# Patient Record
Sex: FEMALE | Race: OTHER | NOT HISPANIC OR LATINO | URBAN - METROPOLITAN AREA
[De-identification: names, ages, dates, MRNs, and addresses within clinical notes are randomized per-mention and may not be internally consistent; named-entity substitution may affect disease eponyms.]

---

## 2017-02-04 ENCOUNTER — EMERGENCY (EMERGENCY)
Facility: HOSPITAL | Age: 62
LOS: 1 days | Discharge: HOME | End: 2017-02-04
Attending: EMERGENCY MEDICINE | Admitting: EMERGENCY MEDICINE
Payer: COMMERCIAL

## 2017-02-04 VITALS
SYSTOLIC BLOOD PRESSURE: 136 MMHG | HEART RATE: 66 BPM | DIASTOLIC BLOOD PRESSURE: 84 MMHG | TEMPERATURE: 98 F | RESPIRATION RATE: 18 BRPM | OXYGEN SATURATION: 100 %

## 2017-02-04 PROCEDURE — 99284 EMERGENCY DEPT VISIT MOD MDM: CPT

## 2017-02-04 NOTE — ED ADULT NURSE NOTE - OBJECTIVE STATEMENT
Float Nurse Note- pt suddenly developed atraumatic R knee pain earlier today. States she cannot lift leg up to reciprocate stairs. was driving here and developed abd pain and vag bleeding. went through two pads. Is postmenopausal, but had a cystoscopy about two weeks ago for unk gyn issues. Was told it was not cancer.  respirations even and unlabored. awaiting urine for UA. iv placed, labs drawn and sent. vitals as noted. pt endorsed to Primary RN Kun Tristan

## 2017-02-04 NOTE — ED ADULT NURSE NOTE - CHIEF COMPLAINT QUOTE
right knee pain/vag bleed    pt states while walking up stairs, hurt right knee and then started to have a vag bleed. used 2 pads. had cystoscopy a couple weeks ago

## 2017-02-04 NOTE — ED ADULT TRIAGE NOTE - CHIEF COMPLAINT QUOTE
right knee pain/vag bleed    pt states while walking up stairs, hurt right knee and then started to have a vag bleed. used 2 pads. right knee pain/vag bleed    pt states while walking up stairs, hurt right knee and then started to have a vag bleed. used 2 pads. had cystoscopy a couple weeks ago

## 2017-02-05 VITALS
HEART RATE: 67 BPM | DIASTOLIC BLOOD PRESSURE: 67 MMHG | RESPIRATION RATE: 14 BRPM | OXYGEN SATURATION: 99 % | SYSTOLIC BLOOD PRESSURE: 116 MMHG

## 2017-02-05 LAB
ALBUMIN SERPL ELPH-MCNC: 3.9 G/DL — SIGNIFICANT CHANGE UP (ref 3.3–5)
ALP SERPL-CCNC: 57 U/L — SIGNIFICANT CHANGE UP (ref 40–120)
ALT FLD-CCNC: 24 U/L — SIGNIFICANT CHANGE UP (ref 4–33)
APPEARANCE UR: CLEAR — SIGNIFICANT CHANGE UP
AST SERPL-CCNC: 11 U/L — SIGNIFICANT CHANGE UP (ref 4–32)
BASE EXCESS BLDV CALC-SCNC: 4.7 MMOL/L — SIGNIFICANT CHANGE UP
BASOPHILS # BLD AUTO: 0.05 K/UL — SIGNIFICANT CHANGE UP (ref 0–0.2)
BASOPHILS NFR BLD AUTO: 0.4 % — SIGNIFICANT CHANGE UP (ref 0–2)
BILIRUB SERPL-MCNC: 0.2 MG/DL — SIGNIFICANT CHANGE UP (ref 0.2–1.2)
BILIRUB UR-MCNC: NEGATIVE — SIGNIFICANT CHANGE UP
BLOOD GAS VENOUS - CREATININE: 0.8 MG/DL — SIGNIFICANT CHANGE UP (ref 0.5–1.3)
BLOOD UR QL VISUAL: HIGH
BUN SERPL-MCNC: 15 MG/DL — SIGNIFICANT CHANGE UP (ref 7–23)
CALCIUM SERPL-MCNC: 9.2 MG/DL — SIGNIFICANT CHANGE UP (ref 8.4–10.5)
CHLORIDE BLDV-SCNC: 102 MMOL/L — SIGNIFICANT CHANGE UP (ref 96–108)
CHLORIDE SERPL-SCNC: 99 MMOL/L — SIGNIFICANT CHANGE UP (ref 98–107)
CO2 SERPL-SCNC: 23 MMOL/L — SIGNIFICANT CHANGE UP (ref 22–31)
COLOR SPEC: SIGNIFICANT CHANGE UP
CREAT SERPL-MCNC: 0.95 MG/DL — SIGNIFICANT CHANGE UP (ref 0.5–1.3)
EOSINOPHIL # BLD AUTO: 0.25 K/UL — SIGNIFICANT CHANGE UP (ref 0–0.5)
EOSINOPHIL NFR BLD AUTO: 2.2 % — SIGNIFICANT CHANGE UP (ref 0–6)
GAS PNL BLDV: 137 MMOL/L — SIGNIFICANT CHANGE UP (ref 136–146)
GLUCOSE BLDV-MCNC: 285 — HIGH (ref 70–99)
GLUCOSE SERPL-MCNC: 283 MG/DL — HIGH (ref 70–99)
GLUCOSE UR-MCNC: >1000 — SIGNIFICANT CHANGE UP
HCO3 BLDV-SCNC: 27 MMOL/L — SIGNIFICANT CHANGE UP (ref 20–27)
HCT VFR BLD CALC: 41.7 % — SIGNIFICANT CHANGE UP (ref 34.5–45)
HCT VFR BLDV CALC: 44.2 % — SIGNIFICANT CHANGE UP (ref 34.5–45)
HGB BLD-MCNC: 14.4 G/DL — SIGNIFICANT CHANGE UP (ref 11.5–15.5)
HGB BLDV-MCNC: 14.4 G/DL — SIGNIFICANT CHANGE UP (ref 11.5–15.5)
IMM GRANULOCYTES NFR BLD AUTO: 0.4 % — SIGNIFICANT CHANGE UP (ref 0–1.5)
KETONES UR-MCNC: NEGATIVE — SIGNIFICANT CHANGE UP
LACTATE BLDV-MCNC: 2.2 MMOL/L — HIGH (ref 0.5–2)
LEUKOCYTE ESTERASE UR-ACNC: NEGATIVE — SIGNIFICANT CHANGE UP
LYMPHOCYTES # BLD AUTO: 44.6 % — HIGH (ref 13–44)
LYMPHOCYTES # BLD AUTO: 5.18 K/UL — HIGH (ref 1–3.3)
MCHC RBC-ENTMCNC: 31 PG — SIGNIFICANT CHANGE UP (ref 27–34)
MCHC RBC-ENTMCNC: 34.5 % — SIGNIFICANT CHANGE UP (ref 32–36)
MCV RBC AUTO: 89.7 FL — SIGNIFICANT CHANGE UP (ref 80–100)
MONOCYTES # BLD AUTO: 0.62 K/UL — SIGNIFICANT CHANGE UP (ref 0–0.9)
MONOCYTES NFR BLD AUTO: 5.3 % — SIGNIFICANT CHANGE UP (ref 2–14)
NEUTROPHILS # BLD AUTO: 5.46 K/UL — SIGNIFICANT CHANGE UP (ref 1.8–7.4)
NEUTROPHILS NFR BLD AUTO: 47.1 % — SIGNIFICANT CHANGE UP (ref 43–77)
NITRITE UR-MCNC: NEGATIVE — SIGNIFICANT CHANGE UP
PCO2 BLDV: 49 MMHG — SIGNIFICANT CHANGE UP (ref 41–51)
PH BLDV: 7.39 PH — SIGNIFICANT CHANGE UP (ref 7.32–7.43)
PH UR: 5.5 — SIGNIFICANT CHANGE UP (ref 4.6–8)
PLATELET # BLD AUTO: 276 K/UL — SIGNIFICANT CHANGE UP (ref 150–400)
PMV BLD: 11.4 FL — SIGNIFICANT CHANGE UP (ref 7–13)
PO2 BLDV: 29 MMHG — LOW (ref 35–40)
POTASSIUM BLDV-SCNC: 3.5 MMOL/L — SIGNIFICANT CHANGE UP (ref 3.4–4.5)
POTASSIUM SERPL-MCNC: 3.7 MMOL/L — SIGNIFICANT CHANGE UP (ref 3.5–5.3)
POTASSIUM SERPL-SCNC: 3.7 MMOL/L — SIGNIFICANT CHANGE UP (ref 3.5–5.3)
PROT SERPL-MCNC: 7.4 G/DL — SIGNIFICANT CHANGE UP (ref 6–8.3)
PROT UR-MCNC: NEGATIVE — SIGNIFICANT CHANGE UP
RBC # BLD: 4.65 M/UL — SIGNIFICANT CHANGE UP (ref 3.8–5.2)
RBC # FLD: 13.2 % — SIGNIFICANT CHANGE UP (ref 10.3–14.5)
RBC CASTS # UR COMP ASSIST: SIGNIFICANT CHANGE UP (ref 0–?)
SAO2 % BLDV: 48.2 % — LOW (ref 60–85)
SODIUM SERPL-SCNC: 142 MMOL/L — SIGNIFICANT CHANGE UP (ref 135–145)
SP GR SPEC: 1.04 — HIGH (ref 1–1.03)
SQUAMOUS # UR AUTO: SIGNIFICANT CHANGE UP
UROBILINOGEN FLD QL: NORMAL E.U. — SIGNIFICANT CHANGE UP (ref 0.1–0.2)
WBC # BLD: 11.61 K/UL — HIGH (ref 3.8–10.5)
WBC # FLD AUTO: 11.61 K/UL — HIGH (ref 3.8–10.5)
WBC UR QL: SIGNIFICANT CHANGE UP (ref 0–?)

## 2017-02-05 PROCEDURE — 76830 TRANSVAGINAL US NON-OB: CPT | Mod: 26

## 2017-02-05 PROCEDURE — 73562 X-RAY EXAM OF KNEE 3: CPT | Mod: 26,RT

## 2017-02-05 RX ORDER — SODIUM CHLORIDE 9 MG/ML
1000 INJECTION INTRAMUSCULAR; INTRAVENOUS; SUBCUTANEOUS ONCE
Qty: 0 | Refills: 0 | Status: COMPLETED | OUTPATIENT
Start: 2017-02-05 | End: 2017-02-05

## 2017-02-05 RX ORDER — IBUPROFEN 200 MG
800 TABLET ORAL ONCE
Qty: 0 | Refills: 0 | Status: COMPLETED | OUTPATIENT
Start: 2017-02-05 | End: 2017-02-05

## 2017-02-05 RX ADMIN — Medication 800 MILLIGRAM(S): at 01:18

## 2017-02-05 RX ADMIN — Medication 800 MILLIGRAM(S): at 05:03

## 2017-02-05 RX ADMIN — SODIUM CHLORIDE 1000 MILLILITER(S): 9 INJECTION INTRAMUSCULAR; INTRAVENOUS; SUBCUTANEOUS at 01:18

## 2017-02-05 RX ADMIN — Medication 800 MILLIGRAM(S): at 02:18

## 2017-02-05 NOTE — ED PROVIDER NOTE - PHYSICAL EXAMINATION
Cole att: Cole att: older female appears stated age, aaox3, ncat, perrl, full eomi without limitation, ctabl, rrr, s1s2, abd soft ntnd, neg chest wall tenderness, pelvis stable. 4 extremities neg ttp pulses and sensation intact. Pos pain with rt knee flexion, neg limiitation rt knee extension.

## 2017-02-05 NOTE — ED PROVIDER NOTE - ATTENDING CONTRIBUTION TO CARE
Dr. Galvan: I have personally seen and examined this patient at the bedside. I have fully participated in the care of this patient. I have reviewed all pertinent clinical information, including history, physical exam, plan and the Resident's note and agree except as noted. HPI above as by me. PE above as by me. RT KNEE DDX likley partial quad tendon rupture, r/o patellar or long bone fx VAG BLEED DDX postmenopausal bleed likely cancerous, unclear if recent biopsy uterine or cervical PLAN cbc cmp xr knee and tib fib, ace wrap, cane, 10 min d/w patient regarding importance of following with her OBGYN regarding biopsy result Dr. Galvan: I have personally seen and examined this patient at the bedside. I have fully participated in the care of this patient. I have reviewed all pertinent clinical information, including history, physical exam, plan and the Resident's note and agree except as noted. HPI above as by me. PE above as by me. RT KNEE DDX likley partial quad tendon rupture, r/o patellar or long bone fx VAG BLEED DDX postmenopausal bleed likely cancerous, unclear if recent biopsy uterine or cervical PLAN cbc cmp xr knee and tib fib, ace wrap, cane, 10 min d/w patient regarding importance of following with her OBGYN regarding biopsy result as post-menopausal bleeding may be cancerous.

## 2017-02-05 NOTE — ED PROVIDER NOTE - EXTREMITY EXAM
RLE: Rknee with decreased ROM 2/2 pain, tender over patella. No laxity. No lateral or medial tenderness.

## 2017-02-05 NOTE — ED PROVIDER NOTE - OBJECTIVE STATEMENT
61F h/o HTN, HLD, DM p/w R knee pain since this afternoon. First noticed it when stood up from chair, and then had difficulty walking up the stairs. Also had some abdominal cramping this evening and began having vaginal bleeding, has gone through 2 pads. Had a uterine biopsy 1/13/2017 that was negative. Denies falls, trauma, other joint pains, fever, n/v/d. Pt is able to ambulate. 61F h/o HTN, HLD, DM p/w R knee pain since this afternoon. First noticed it when stood up from chair, and then had difficulty walking up the stairs. Also had some abdominal cramping this evening and began having vaginal bleeding, has gone through 2 pads. Had a uterine biopsy 1/13/2017 that was negative. Denies falls, trauma, other joint pains, fever, n/v/d. Pt is able to ambulate.  Cole att: 61F hx, htn, dm c/o atraumatic rt knee pain x 1 day. Today patient citlaly from seated position, unable to push off of rt foot, rt knee no force. While climbing stairs "knee not giving me pressure to raise leg up. Feels like something is biting me in knee." Denies ha, denies distal motor or sensory deficit of affected limb. Knee pain abated with motrin. Incidental vaginal bleeding this evening, 2 pads this evening,  2015 uterine polyps, biopsy 1/13 with Dr. Hernandez negative.

## 2017-02-05 NOTE — ED PROVIDER NOTE - PLAN OF CARE
- Follow up with orthopedics within one week, see attached list or Call [phone] to find a doctor affiliated with [facility] in your neighborhood.   - Wear knee immobilizer, advance ambulating as tolerated. Take motrin for pain.   - Follow up with your OBGYN, bring results with you to the appointment.   - Return to the ED for new or worsening symptoms.

## 2017-02-05 NOTE — ED ADULT NURSE REASSESSMENT NOTE - NS ED NURSE REASSESS COMMENT FT1
Informed the resident about lactate 2.2, he said no need to repeat after bolus because it's not sepsis.

## 2017-02-05 NOTE — ED PROVIDER NOTE - CARE PLAN
Principal Discharge DX:	Knee pain  Instructions for follow-up, activity and diet:	- Follow up with orthopedics within one week, see attached list or Call [phone] to find a doctor affiliated with [facility] in your neighborhood.   - Wear knee immobilizer, advance ambulating as tolerated. Take motrin for pain.   - Follow up with your OBGYN, bring results with you to the appointment.   - Return to the ED for new or worsening symptoms.  Secondary Diagnosis:	Vaginal bleeding Principal Discharge DX:	Knee pain  Instructions for follow-up, activity and diet:	- Follow up with orthopedics within one week, see attached list or Call [phone] to find a doctor affiliated with [facility] in your neighborhood.   - Wear knee immobilizer, advance ambulating as tolerated. Take motrin for pain.   - Follow up with your OBGYN, bring results with you to the appointment.   - Return to the ED for new or worsening symptoms.  Secondary Diagnosis:	Vaginal bleeding Principal Discharge DX:	Knee pain  Instructions for follow-up, activity and diet:	- Follow up with orthopedics within one week, see attached list or Call [phone] to find a doctor affiliated with [facility] in your neighborhood.   - Wear knee immobilizer, advance ambulating as tolerated. Take motrin for pain.   - Follow up with your OBGYN, bring results with you to the appointment.   - Return to the ED for new or worsening symptoms.  Secondary Diagnosis:	Vaginal bleeding

## 2017-12-12 ENCOUNTER — EMERGENCY (EMERGENCY)
Facility: HOSPITAL | Age: 62
LOS: 1 days | Discharge: HOME | End: 2017-12-12
Attending: EMERGENCY MEDICINE | Admitting: EMERGENCY MEDICINE
Payer: COMMERCIAL

## 2017-12-12 VITALS
RESPIRATION RATE: 16 BRPM | OXYGEN SATURATION: 100 % | TEMPERATURE: 98 F | HEART RATE: 71 BPM | DIASTOLIC BLOOD PRESSURE: 71 MMHG | SYSTOLIC BLOOD PRESSURE: 132 MMHG

## 2017-12-12 LAB
BASE EXCESS BLDV CALC-SCNC: 6 MMOL/L — SIGNIFICANT CHANGE UP
BASOPHILS # BLD AUTO: 0.07 K/UL — SIGNIFICANT CHANGE UP (ref 0–0.2)
BASOPHILS NFR BLD AUTO: 0.7 % — SIGNIFICANT CHANGE UP (ref 0–2)
BLOOD GAS VENOUS - CREATININE: 0.75 MG/DL — SIGNIFICANT CHANGE UP (ref 0.5–1.3)
CHLORIDE BLDV-SCNC: 100 MMOL/L — SIGNIFICANT CHANGE UP (ref 96–108)
EOSINOPHIL # BLD AUTO: 0.24 K/UL — SIGNIFICANT CHANGE UP (ref 0–0.5)
EOSINOPHIL NFR BLD AUTO: 2.5 % — SIGNIFICANT CHANGE UP (ref 0–6)
GAS PNL BLDV: 138 MMOL/L — SIGNIFICANT CHANGE UP (ref 136–146)
GLUCOSE BLDV-MCNC: 279 — HIGH (ref 70–99)
HCO3 BLDV-SCNC: 27 MMOL/L — SIGNIFICANT CHANGE UP (ref 20–27)
HCT VFR BLD CALC: 38.9 % — SIGNIFICANT CHANGE UP (ref 34.5–45)
HCT VFR BLDV CALC: 42.5 % — SIGNIFICANT CHANGE UP (ref 34.5–45)
HGB BLD-MCNC: 13.7 G/DL — SIGNIFICANT CHANGE UP (ref 11.5–15.5)
HGB BLDV-MCNC: 13.8 G/DL — SIGNIFICANT CHANGE UP (ref 11.5–15.5)
IMM GRANULOCYTES # BLD AUTO: 0.03 # — SIGNIFICANT CHANGE UP
IMM GRANULOCYTES NFR BLD AUTO: 0.3 % — SIGNIFICANT CHANGE UP (ref 0–1.5)
LACTATE BLDV-MCNC: 2 MMOL/L — SIGNIFICANT CHANGE UP (ref 0.5–2)
LYMPHOCYTES # BLD AUTO: 4.65 K/UL — HIGH (ref 1–3.3)
LYMPHOCYTES # BLD AUTO: 48.7 % — HIGH (ref 13–44)
MCHC RBC-ENTMCNC: 32.2 PG — SIGNIFICANT CHANGE UP (ref 27–34)
MCHC RBC-ENTMCNC: 35.2 % — SIGNIFICANT CHANGE UP (ref 32–36)
MCV RBC AUTO: 91.3 FL — SIGNIFICANT CHANGE UP (ref 80–100)
MONOCYTES # BLD AUTO: 0.58 K/UL — SIGNIFICANT CHANGE UP (ref 0–0.9)
MONOCYTES NFR BLD AUTO: 6.1 % — SIGNIFICANT CHANGE UP (ref 2–14)
NEUTROPHILS # BLD AUTO: 3.98 K/UL — SIGNIFICANT CHANGE UP (ref 1.8–7.4)
NEUTROPHILS NFR BLD AUTO: 41.7 % — LOW (ref 43–77)
NRBC # FLD: 0 — SIGNIFICANT CHANGE UP
PCO2 BLDV: 59 MMHG — HIGH (ref 41–51)
PH BLDV: 7.35 PH — SIGNIFICANT CHANGE UP (ref 7.32–7.43)
PLATELET # BLD AUTO: 248 K/UL — SIGNIFICANT CHANGE UP (ref 150–400)
PMV BLD: 10.7 FL — SIGNIFICANT CHANGE UP (ref 7–13)
PO2 BLDV: < 24 MMHG — LOW (ref 35–40)
POTASSIUM BLDV-SCNC: 3.8 MMOL/L — SIGNIFICANT CHANGE UP (ref 3.4–4.5)
RBC # BLD: 4.26 M/UL — SIGNIFICANT CHANGE UP (ref 3.8–5.2)
RBC # FLD: 12.3 % — SIGNIFICANT CHANGE UP (ref 10.3–14.5)
SAO2 % BLDV: 36.1 % — LOW (ref 60–85)
WBC # BLD: 9.55 K/UL — SIGNIFICANT CHANGE UP (ref 3.8–10.5)
WBC # FLD AUTO: 9.55 K/UL — SIGNIFICANT CHANGE UP (ref 3.8–10.5)

## 2017-12-12 PROCEDURE — 99285 EMERGENCY DEPT VISIT HI MDM: CPT

## 2017-12-12 RX ORDER — SODIUM CHLORIDE 9 MG/ML
1000 INJECTION INTRAMUSCULAR; INTRAVENOUS; SUBCUTANEOUS ONCE
Qty: 0 | Refills: 0 | Status: COMPLETED | OUTPATIENT
Start: 2017-12-12 | End: 2017-12-12

## 2017-12-12 RX ORDER — MORPHINE SULFATE 50 MG/1
4 CAPSULE, EXTENDED RELEASE ORAL ONCE
Qty: 0 | Refills: 0 | Status: DISCONTINUED | OUTPATIENT
Start: 2017-12-12 | End: 2017-12-12

## 2017-12-12 RX ADMIN — MORPHINE SULFATE 4 MILLIGRAM(S): 50 CAPSULE, EXTENDED RELEASE ORAL at 23:32

## 2017-12-12 RX ADMIN — SODIUM CHLORIDE 1000 MILLILITER(S): 9 INJECTION INTRAMUSCULAR; INTRAVENOUS; SUBCUTANEOUS at 23:33

## 2017-12-12 NOTE — ED PROVIDER NOTE - OBJECTIVE STATEMENT
62F Diabetes presents with LLQ pain. Patient seen GYN in February was found to have polyps and was started on hormonal therapy. Patient then intermittently in the past two weeks have been having intermittent LLQ/pelvic pain 62F Diabetes presents with LLQ pain. Patient seen GYN in February was found to have polyps and was started on hormonal therapy. Patient then intermittently in the past two weeks have been having intermittent LLQ/pelvic pain. Went to the PMD was found to have a white count of ~11 and GYN started on abx and diflucan for possible vaginal yeast infection and UTI. Patient comes to ED today for acute severe left pelvic abdominal pain.

## 2017-12-12 NOTE — ED ADULT NURSE NOTE - OBJECTIVE STATEMENT
Pt received A&Ox3 to ED Rm 19 with c/o LLQ pain x 1 wk worsened today. States she initially had abd cramping but subsided sp doxy prescribed by her OBGYN (unknown dx). Also c/o vag spotting x 1d - currently none. Also c/o intermit nausea, chills. States she had US done which showed polyps & scheduled for d&c on Fri. States menopause x 5yrs. RR equal & unlabored. Abd S/T/ND. MD person in prog.

## 2017-12-12 NOTE — ED ADULT NURSE NOTE - CHIEF COMPLAINT QUOTE
c.o LLQ pain x3 days. states she is having trouble breathing s/t pain. denies nausea/vomiting or urinary symptoms. recently was on progesterone for pelvic cramping and antibiotics for elevated WBC. pmh htn hld

## 2017-12-12 NOTE — ED PROVIDER NOTE - CHIEF COMPLAINT
The patient is a 62y Female complaining of The patient is a 62y Female complaining of left pelvic pain

## 2017-12-12 NOTE — ED PROVIDER NOTE - ATTENDING CONTRIBUTION TO CARE
nickolas: approx 2 week hx of intermittent low abd cramping. saw pcp/gyn (DR Corbin) one week prior who prescribed doxy and hormonal therapy. had ultrasound which by pts. hx showed uterine polyps and d and c scheduled. sx decreased. today sudden onset severe pain left lower abdomen. no other sx. no hx similar pain in past.  exam: point tender LLQ.   labs sent. US pelvis scheduled; if negative and pain persists, to obtain CT. nickolas: approx 2 week hx of intermittent low abd cramping. saw pcp/gyn (DR Corbin) one week prior who prescribed doxy and hormonal therapy. had ultrasound which by pts. hx showed uterine polyps and d and c scheduled. sx decreased. today sudden onset severe pain left lower abdomen. no other sx. no hx similar pain in past..  exam: point tender LLQ.   labs sent. US pelvis scheduled; if negative and pain persists, to obtain CT.

## 2017-12-12 NOTE — ED PROVIDER NOTE - PROGRESS NOTE DETAILS
US vaginal if negative will need CT scan Sign out follow-up: CT a/p and TV US negative for acute pathology. Pt to f/u with Gyn and GI outpt. SHY.

## 2017-12-13 VITALS
OXYGEN SATURATION: 100 % | RESPIRATION RATE: 15 BRPM | SYSTOLIC BLOOD PRESSURE: 115 MMHG | HEART RATE: 69 BPM | DIASTOLIC BLOOD PRESSURE: 64 MMHG

## 2017-12-13 LAB
ALBUMIN SERPL ELPH-MCNC: 4 G/DL — SIGNIFICANT CHANGE UP (ref 3.3–5)
ALP SERPL-CCNC: 61 U/L — SIGNIFICANT CHANGE UP (ref 40–120)
ALT FLD-CCNC: 41 U/L — HIGH (ref 4–33)
APPEARANCE UR: CLEAR — SIGNIFICANT CHANGE UP
AST SERPL-CCNC: 27 U/L — SIGNIFICANT CHANGE UP (ref 4–32)
BILIRUB SERPL-MCNC: 0.2 MG/DL — SIGNIFICANT CHANGE UP (ref 0.2–1.2)
BILIRUB UR-MCNC: NEGATIVE — SIGNIFICANT CHANGE UP
BLOOD UR QL VISUAL: NEGATIVE — SIGNIFICANT CHANGE UP
BUN SERPL-MCNC: 14 MG/DL — SIGNIFICANT CHANGE UP (ref 7–23)
CALCIUM SERPL-MCNC: 8.3 MG/DL — LOW (ref 8.4–10.5)
CHLORIDE SERPL-SCNC: 101 MMOL/L — SIGNIFICANT CHANGE UP (ref 98–107)
CO2 SERPL-SCNC: 29 MMOL/L — SIGNIFICANT CHANGE UP (ref 22–31)
COLOR SPEC: SIGNIFICANT CHANGE UP
CREAT SERPL-MCNC: 0.82 MG/DL — SIGNIFICANT CHANGE UP (ref 0.5–1.3)
GLUCOSE SERPL-MCNC: 275 MG/DL — HIGH (ref 70–99)
GLUCOSE UR-MCNC: 50 — HIGH
KETONES UR-MCNC: NEGATIVE — SIGNIFICANT CHANGE UP
LEUKOCYTE ESTERASE UR-ACNC: NEGATIVE — SIGNIFICANT CHANGE UP
LIDOCAIN IGE QN: 71.5 U/L — HIGH (ref 7–60)
MUCOUS THREADS # UR AUTO: SIGNIFICANT CHANGE UP
NITRITE UR-MCNC: NEGATIVE — SIGNIFICANT CHANGE UP
PH UR: 6 — SIGNIFICANT CHANGE UP (ref 4.6–8)
POTASSIUM SERPL-MCNC: 4.3 MMOL/L — SIGNIFICANT CHANGE UP (ref 3.5–5.3)
POTASSIUM SERPL-SCNC: 4.3 MMOL/L — SIGNIFICANT CHANGE UP (ref 3.5–5.3)
PROT SERPL-MCNC: 6.8 G/DL — SIGNIFICANT CHANGE UP (ref 6–8.3)
PROT UR-MCNC: NEGATIVE MG/DL — SIGNIFICANT CHANGE UP
RBC CASTS # UR COMP ASSIST: SIGNIFICANT CHANGE UP (ref 0–?)
SODIUM SERPL-SCNC: 142 MMOL/L — SIGNIFICANT CHANGE UP (ref 135–145)
SP GR SPEC: 1.02 — SIGNIFICANT CHANGE UP (ref 1–1.04)
SQUAMOUS # UR AUTO: SIGNIFICANT CHANGE UP
UROBILINOGEN FLD QL: NORMAL MG/DL — SIGNIFICANT CHANGE UP
WBC UR QL: SIGNIFICANT CHANGE UP (ref 0–?)

## 2017-12-13 PROCEDURE — 76830 TRANSVAGINAL US NON-OB: CPT | Mod: 26

## 2017-12-13 PROCEDURE — 74177 CT ABD & PELVIS W/CONTRAST: CPT | Mod: 26

## 2017-12-13 RX ORDER — ACETAMINOPHEN 500 MG
1000 TABLET ORAL ONCE
Qty: 0 | Refills: 0 | Status: COMPLETED | OUTPATIENT
Start: 2017-12-13 | End: 2017-12-13

## 2017-12-13 RX ORDER — MORPHINE SULFATE 50 MG/1
4 CAPSULE, EXTENDED RELEASE ORAL ONCE
Qty: 0 | Refills: 0 | Status: DISCONTINUED | OUTPATIENT
Start: 2017-12-13 | End: 2017-12-13

## 2017-12-13 RX ADMIN — Medication 400 MILLIGRAM(S): at 05:01

## 2017-12-13 RX ADMIN — MORPHINE SULFATE 4 MILLIGRAM(S): 50 CAPSULE, EXTENDED RELEASE ORAL at 01:46

## 2017-12-14 LAB
BACTERIA UR CULT: SIGNIFICANT CHANGE UP
SPECIMEN SOURCE: SIGNIFICANT CHANGE UP

## 2018-01-29 PROBLEM — Z00.00 ENCOUNTER FOR PREVENTIVE HEALTH EXAMINATION: Status: ACTIVE | Noted: 2018-01-29

## 2018-01-30 ENCOUNTER — APPOINTMENT (OUTPATIENT)
Dept: MRI IMAGING | Facility: IMAGING CENTER | Age: 63
End: 2018-01-30

## 2018-01-30 ENCOUNTER — OUTPATIENT (OUTPATIENT)
Dept: OUTPATIENT SERVICES | Facility: HOSPITAL | Age: 63
LOS: 1 days | End: 2018-01-30
Payer: COMMERCIAL

## 2018-01-30 DIAGNOSIS — Z00.8 ENCOUNTER FOR OTHER GENERAL EXAMINATION: ICD-10-CM

## 2018-01-30 PROCEDURE — 82565 ASSAY OF CREATININE: CPT

## 2018-01-30 PROCEDURE — A9585: CPT

## 2018-01-30 PROCEDURE — 72197 MRI PELVIS W/O & W/DYE: CPT | Mod: 26

## 2018-01-30 PROCEDURE — 72197 MRI PELVIS W/O & W/DYE: CPT

## 2018-02-06 ENCOUNTER — APPOINTMENT (OUTPATIENT)
Dept: ULTRASOUND IMAGING | Facility: IMAGING CENTER | Age: 63
End: 2018-02-06
Payer: COMMERCIAL

## 2018-02-06 ENCOUNTER — OUTPATIENT (OUTPATIENT)
Dept: OUTPATIENT SERVICES | Facility: HOSPITAL | Age: 63
LOS: 1 days | End: 2018-02-06
Payer: COMMERCIAL

## 2018-02-06 DIAGNOSIS — Z00.8 ENCOUNTER FOR OTHER GENERAL EXAMINATION: ICD-10-CM

## 2018-02-06 PROCEDURE — 76700 US EXAM ABDOM COMPLETE: CPT | Mod: 26

## 2018-02-06 PROCEDURE — 76700 US EXAM ABDOM COMPLETE: CPT

## 2018-02-07 ENCOUNTER — APPOINTMENT (OUTPATIENT)
Dept: SURGERY | Facility: CLINIC | Age: 63
End: 2018-02-07

## 2018-02-13 ENCOUNTER — OUTPATIENT (OUTPATIENT)
Dept: OUTPATIENT SERVICES | Facility: HOSPITAL | Age: 63
LOS: 1 days | End: 2018-02-13
Payer: COMMERCIAL

## 2018-02-13 VITALS
HEIGHT: 63 IN | WEIGHT: 179.9 LBS | TEMPERATURE: 98 F | DIASTOLIC BLOOD PRESSURE: 70 MMHG | RESPIRATION RATE: 14 BRPM | SYSTOLIC BLOOD PRESSURE: 110 MMHG | HEART RATE: 60 BPM

## 2018-02-13 DIAGNOSIS — Z98.890 OTHER SPECIFIED POSTPROCEDURAL STATES: Chronic | ICD-10-CM

## 2018-02-13 DIAGNOSIS — N85.00 ENDOMETRIAL HYPERPLASIA, UNSPECIFIED: ICD-10-CM

## 2018-02-13 DIAGNOSIS — E11.9 TYPE 2 DIABETES MELLITUS WITHOUT COMPLICATIONS: ICD-10-CM

## 2018-02-13 LAB
BUN SERPL-MCNC: 7 MG/DL — SIGNIFICANT CHANGE UP (ref 7–23)
CALCIUM SERPL-MCNC: 8.3 MG/DL — LOW (ref 8.4–10.5)
CHLORIDE SERPL-SCNC: 98 MMOL/L — SIGNIFICANT CHANGE UP (ref 98–107)
CO2 SERPL-SCNC: 31 MMOL/L — SIGNIFICANT CHANGE UP (ref 22–31)
CREAT SERPL-MCNC: 0.7 MG/DL — SIGNIFICANT CHANGE UP (ref 0.5–1.3)
GLUCOSE SERPL-MCNC: 295 MG/DL — HIGH (ref 70–99)
HBA1C BLD-MCNC: 10.3 % — HIGH (ref 4–5.6)
HCT VFR BLD CALC: 41.5 % — SIGNIFICANT CHANGE UP (ref 34.5–45)
HGB BLD-MCNC: 13.7 G/DL — SIGNIFICANT CHANGE UP (ref 11.5–15.5)
MCHC RBC-ENTMCNC: 30.2 PG — SIGNIFICANT CHANGE UP (ref 27–34)
MCHC RBC-ENTMCNC: 33 % — SIGNIFICANT CHANGE UP (ref 32–36)
MCV RBC AUTO: 91.4 FL — SIGNIFICANT CHANGE UP (ref 80–100)
NRBC # FLD: 0 — SIGNIFICANT CHANGE UP
PLATELET # BLD AUTO: 277 K/UL — SIGNIFICANT CHANGE UP (ref 150–400)
PMV BLD: 11.4 FL — SIGNIFICANT CHANGE UP (ref 7–13)
POTASSIUM SERPL-MCNC: 3.9 MMOL/L — SIGNIFICANT CHANGE UP (ref 3.5–5.3)
POTASSIUM SERPL-SCNC: 3.9 MMOL/L — SIGNIFICANT CHANGE UP (ref 3.5–5.3)
RBC # BLD: 4.54 M/UL — SIGNIFICANT CHANGE UP (ref 3.8–5.2)
RBC # FLD: 12.6 % — SIGNIFICANT CHANGE UP (ref 10.3–14.5)
SODIUM SERPL-SCNC: 140 MMOL/L — SIGNIFICANT CHANGE UP (ref 135–145)
WBC # BLD: 7.16 K/UL — SIGNIFICANT CHANGE UP (ref 3.8–10.5)
WBC # FLD AUTO: 7.16 K/UL — SIGNIFICANT CHANGE UP (ref 3.8–10.5)

## 2018-02-13 PROCEDURE — 93010 ELECTROCARDIOGRAM REPORT: CPT

## 2018-02-13 RX ORDER — KETOROLAC TROMETHAMINE 0.5 %
1 DROPS OPHTHALMIC (EYE)
Qty: 10 | Refills: 0 | OUTPATIENT
Start: 2018-02-13 | End: 2018-02-15

## 2018-02-13 RX ORDER — IBUPROFEN 200 MG
1 TABLET ORAL
Qty: 18 | Refills: 0 | OUTPATIENT
Start: 2018-02-13 | End: 2018-02-18

## 2018-02-13 NOTE — H&P PST ADULT - RS GEN PE MLT RESP DETAILS PC
airway patent/no rhonchi/no subcutaneous emphysema/no wheezes/breath sounds equal/good air movement/respirations non-labored/no intercostal retractions/clear to auscultation bilaterally/no chest wall tenderness/no rales

## 2018-02-13 NOTE — H&P PST ADULT - HISTORY OF PRESENT ILLNESS
post menopausal bleeding on 08/2017, "lining of uterus is thichening LLQ intermittent pain. S/P Ct scan , S/P colonoscopy 02/07/18. 61 y/o female with PMH: HTN, DM type 2, Dyslipidemia presents to PST for pre op evaluation with h/o post menopausal bleeding on 08/2017, LLQ intermittent pain. S/P Ct scan.  S/P colonoscopy 02/07/18. Pre op diagnosis endometrial hyperplasia , unspecified. Now scheduled for dilation and curettage hysteroscopy on 02/20/18

## 2018-02-13 NOTE — H&P PST ADULT - PROBLEM SELECTOR PLAN 2
Monitor BS on day of surgery. Pt instructed not to take diabetes medications on day of surgery. Pt verbalized understanding.

## 2018-02-13 NOTE — H&P PST ADULT - NSANTHOSAYNRD_GEN_A_CORE
No. CHERRY screening performed.  STOP BANG Legend: 0-2 = LOW Risk; 3-4 = INTERMEDIATE Risk; 5-8 = HIGH Risk

## 2018-02-13 NOTE — H&P PST ADULT - VISION (WITH CORRECTIVE LENSES IF THE PATIENT USUALLY WEARS THEM):
reading glasses prn/Normal vision: sees adequately in most situations; can see medication labels, newsprint

## 2018-02-13 NOTE — H&P PST ADULT - PMH
Diabetes    HLD (hyperlipidemia)    HTN (hypertension) Diabetes    HLD (hyperlipidemia)    HTN (hypertension)    Obesity (BMI 30.0-34.9)

## 2018-02-13 NOTE — H&P PST ADULT - PROBLEM SELECTOR PLAN 1
Scheduled for dilation and curettage hysteroscopy on 02/20/18. Pre op instructions, famotidine given and explained. Pt verbalized understanding.

## 2018-02-13 NOTE — H&P PST ADULT - NEGATIVE OPHTHALMOLOGIC SYMPTOMS
no lacrimation L/no diplopia/no irritation R/no discharge L/no irritation L/no blurred vision L/no blurred vision R/no discharge R/no pain L/no lacrimation R/no photophobia/no pain R/no loss of vision L/no scleral injection L/no loss of vision R

## 2019-06-09 ENCOUNTER — EMERGENCY (EMERGENCY)
Facility: HOSPITAL | Age: 64
LOS: 1 days | Discharge: HOME | End: 2019-06-09
Attending: EMERGENCY MEDICINE | Admitting: EMERGENCY MEDICINE
Payer: OTHER MISCELLANEOUS

## 2019-06-09 VITALS
SYSTOLIC BLOOD PRESSURE: 133 MMHG | HEART RATE: 68 BPM | RESPIRATION RATE: 15 BRPM | OXYGEN SATURATION: 100 % | TEMPERATURE: 98 F | DIASTOLIC BLOOD PRESSURE: 78 MMHG

## 2019-06-09 DIAGNOSIS — Z98.890 OTHER SPECIFIED POSTPROCEDURAL STATES: Chronic | ICD-10-CM

## 2019-06-09 PROBLEM — E66.9 OBESITY, UNSPECIFIED: Chronic | Status: ACTIVE | Noted: 2018-02-13

## 2019-06-09 PROBLEM — I10 ESSENTIAL (PRIMARY) HYPERTENSION: Chronic | Status: ACTIVE | Noted: 2017-02-05

## 2019-06-09 PROBLEM — E78.5 HYPERLIPIDEMIA, UNSPECIFIED: Chronic | Status: ACTIVE | Noted: 2017-02-05

## 2019-06-09 PROCEDURE — 99283 EMERGENCY DEPT VISIT LOW MDM: CPT | Mod: 25

## 2019-06-09 PROCEDURE — 73562 X-RAY EXAM OF KNEE 3: CPT | Mod: 26,RT

## 2019-06-09 RX ORDER — ACETAMINOPHEN 500 MG
975 TABLET ORAL ONCE
Refills: 0 | Status: COMPLETED | OUTPATIENT
Start: 2019-06-09 | End: 2019-06-09

## 2019-06-09 RX ORDER — IBUPROFEN 200 MG
600 TABLET ORAL ONCE
Refills: 0 | Status: COMPLETED | OUTPATIENT
Start: 2019-06-09 | End: 2019-06-09

## 2019-06-09 RX ADMIN — Medication 600 MILLIGRAM(S): at 03:35

## 2019-06-09 RX ADMIN — Medication 975 MILLIGRAM(S): at 03:35

## 2019-06-09 NOTE — ED ADULT TRIAGE NOTE - CHIEF COMPLAINT QUOTE
Pt c/o mechanical fall down two concrete steps on Monday.  Pt denies any head injury or LOC.  Denies any use of blood thinners.  c/o R leg pain radiating from foot up to buttock.  Ambulatory in triage, no obvious signs of injury.  Pain unrelieved with motrin.  PMHx:  HTN, high cholesterol

## 2019-06-09 NOTE — ED PROVIDER NOTE - PHYSICAL EXAMINATION
gen: well appearing  Mentation: AAO x 3  psych: mood appropriate  ENT: airway patent  Eyes: conjunctivae clear bilaterally  Cardio: RRR, no m/r/g  Resp: normal BS b/l  GI: s/nt/nd   Neuro: AAO x 3, sensation and motor function intact  Skin: No evidence of rash or bruising   MSK: tenderness over the lateral inferior aspect of the right knee or superior lateral aspect of tibia, no midline spinal tenderness, no scalp hematoma

## 2019-06-09 NOTE — ED PROVIDER NOTE - OBJECTIVE STATEMENT
65 yo female presenting with worsening right knee pain s/p mechanical fall on Monday after falling two steps.  no loc, able to ambulate after the event.  radiation of the pain up the leg.  described as throbbing pain, severe, took 800 mg of ibuprofen at 3 pm with moderate transient relief of symptoms.  still able to ambulate with pain.

## 2019-06-09 NOTE — ED ADULT NURSE NOTE - OBJECTIVE STATEMENT
pt pt received alert and oriented x3. pt s/p mechanical fall on monday. decline loc. pt c/o having right knee pain. no visible injury noted. knee tender to toucher. respirations equal and unlabored. pt declines chest pain, sob,n/v/d,fevers, chills. Call bell in reach, warm blanket provided, bed in lowest position, side rails up x2,safety maintained. will continue to monitor. report given to primary rn efraín.

## 2019-06-09 NOTE — ED PROVIDER NOTE - ATTENDING CONTRIBUTION TO CARE
64F h/o HTN, DM presents after mechanical fall last week. Reports she fell two steps, landed on R knee. Has been able to ambulate since but with increasing pain. No other injury. On exam well appearing, nad, mmm, breathing comfortably, 2+ pulses, R knee with ecchymosis over anterior knee, able to  fully extend, ttp along b/l joint line, no joint effusion, 5/5 motor, sensation intact. .XR no acute fx. Plan for ACE wrap, cane and ortho f/u for potential ligamentous injury vs contusion.

## 2019-06-09 NOTE — ED PROVIDER NOTE - NSFOLLOWUPINSTRUCTIONS_ED_ALL_ED_FT
Please follow up with an orthopedic surgeon within the next week in regards to your symptoms.  Take Tylenol 1g every six hours and supplement with ibuprofen 600mg, with food, every six hours which can be taken three hours apart from the Tylenol to have a layered effect.  Drink at least 2 Liters or 64 Ounces of water each day.  Return for any persistent, worsening symptoms, or ANY concerns at all.

## 2019-06-09 NOTE — ED ADULT NURSE NOTE - NSIMPLEMENTINTERV_GEN_ALL_ED
Implemented All Fall Risk Interventions:  [place] to call system. Call bell, personal items and telephone within reach. Instruct patient to call for assistance. Room bathroom lighting operational. Non-slip footwear when patient is off stretcher. Physically safe environment: no spills, clutter or unnecessary equipment. Stretcher in lowest position, wheels locked, appropriate side rails in place. Provide visual cue, wrist band, yellow gown, etc. Monitor gait and stability. Monitor for mental status changes and reorient to person, place, and time. Review medications for side effects contributing to fall risk. Reinforce activity limits and safety measures with patient and family.

## 2019-06-11 ENCOUNTER — APPOINTMENT (OUTPATIENT)
Dept: ORTHOPEDIC SURGERY | Facility: CLINIC | Age: 64
End: 2019-06-11
Payer: OTHER MISCELLANEOUS

## 2019-06-11 VITALS
DIASTOLIC BLOOD PRESSURE: 78 MMHG | HEART RATE: 64 BPM | WEIGHT: 181 LBS | HEIGHT: 61.25 IN | BODY MASS INDEX: 33.74 KG/M2 | SYSTOLIC BLOOD PRESSURE: 116 MMHG

## 2019-06-11 DIAGNOSIS — Z86.39 PERSONAL HISTORY OF OTHER ENDOCRINE, NUTRITIONAL AND METABOLIC DISEASE: ICD-10-CM

## 2019-06-11 DIAGNOSIS — Z78.9 OTHER SPECIFIED HEALTH STATUS: ICD-10-CM

## 2019-06-11 DIAGNOSIS — M25.561 PAIN IN RIGHT KNEE: ICD-10-CM

## 2019-06-11 DIAGNOSIS — M25.551 PAIN IN RIGHT HIP: ICD-10-CM

## 2019-06-11 DIAGNOSIS — Z86.79 PERSONAL HISTORY OF OTHER DISEASES OF THE CIRCULATORY SYSTEM: ICD-10-CM

## 2019-06-11 PROCEDURE — 73502 X-RAY EXAM HIP UNI 2-3 VIEWS: CPT | Mod: RT

## 2019-06-11 PROCEDURE — 99204 OFFICE O/P NEW MOD 45 MIN: CPT

## 2019-06-11 RX ORDER — LANCETS
EACH MISCELLANEOUS
Qty: 200 | Refills: 0 | Status: ACTIVE | COMMUNITY
Start: 2019-02-04

## 2019-06-11 RX ORDER — GLYBURIDE AND METFORMIN HYDROCHLORIDE 5; 500 MG/1; MG/1
5-500 TABLET, FILM COATED ORAL
Qty: 360 | Refills: 0 | Status: ACTIVE | COMMUNITY
Start: 2019-03-19

## 2019-06-11 RX ORDER — BLOOD SUGAR DIAGNOSTIC
STRIP MISCELLANEOUS
Qty: 200 | Refills: 0 | Status: ACTIVE | COMMUNITY
Start: 2019-02-05

## 2019-06-11 RX ORDER — LEVOFLOXACIN 500 MG/1
500 TABLET, FILM COATED ORAL
Qty: 10 | Refills: 0 | Status: ACTIVE | COMMUNITY
Start: 2019-04-08

## 2019-06-11 RX ORDER — SIMVASTATIN 40 MG/1
40 TABLET, FILM COATED ORAL
Qty: 90 | Refills: 0 | Status: ACTIVE | COMMUNITY
Start: 2019-02-04

## 2019-06-11 RX ORDER — ERGOCALCIFEROL 1.25 MG/1
1.25 MG CAPSULE, LIQUID FILLED ORAL
Qty: 13 | Refills: 0 | Status: ACTIVE | COMMUNITY
Start: 2019-02-04

## 2019-06-11 RX ORDER — LOSARTAN POTASSIUM AND HYDROCHLOROTHIAZIDE 12.5; 5 MG/1; MG/1
50-12.5 TABLET ORAL
Qty: 90 | Refills: 0 | Status: ACTIVE | COMMUNITY
Start: 2019-02-04

## 2019-06-11 RX ORDER — SITAGLIPTIN 100 MG/1
100 TABLET, FILM COATED ORAL
Qty: 90 | Refills: 0 | Status: ACTIVE | COMMUNITY
Start: 2019-02-04

## 2019-06-11 RX ORDER — TRAMADOL HYDROCHLORIDE 50 MG/1
50 TABLET, COATED ORAL
Qty: 20 | Refills: 0 | Status: ACTIVE | COMMUNITY
Start: 2019-06-11 | End: 1900-01-01

## 2019-06-11 NOTE — PHYSICAL EXAM
[de-identified] : Patient is well nourished, well-developed, in no acute distress, with appropriate mood and affect. The patient is oriented to time, place, and person. Respirations are even and unlabored. Gait evaluation was very limited secondary to severe knee pain. There is no inguinal adenopathy. Examination of the contralateral knee shows normal range of motion, strength, no tenderness, and intact skin. The affected limb is well-perfused, without skin lesions, shows a grossly normal motor and sensory examination. Knee motion is significantly reduced and does cause significant pain. The right knee moves passively from 0 to 80 degrees but actively she will only ranges from 60-80 degrees.  She is unable to perform a straight leg raise limited mainly by pain.  I am unable to test stability in AP or ML direction secondary to pain.  The alignment of the knee is 5 degrees varus. Muscle strength is normal. Pedal pulses are palpable. Hip examination was negative.\par  [de-identified] : Long standing knee, AP knee, lateral knee views of the right knee were reviewed from the emergency department and demonstrate no evidence of degenerative joint disease of the knee, fractures, intra-articular pathology.\par \par AP and lateral x-rays of the right hip, pelvis, and femur were ordered and taken in the office and demonstrate mild degenerative joint disease of the hip with joint space narrowing, osteophyte formation, and subchondral sclerosis.\par

## 2019-06-11 NOTE — HISTORY OF PRESENT ILLNESS
[de-identified] : This is very nice 64-year-old female experiencing acute right knee pain, which is severe in intensity.  This started after a fall.  The fall was 8 days ago.  She did not hit her head when she fell.  The pain substantially limits activities of daily living. Walking tolerance is reduced.  She has difficulty bending her knee.  She went to the emergency department where radiographs did not demonstrate any fracture.  She was told to follow-up with orthopedic surgery.  She does not use a knee brace.  She does use a walker at home.  She is here today in a wheelchair.  The patient denies any radiation of the pain to the feet and it is not associated with numbness, tingling, or weakness.  She has pain all throughout the knee.  She is not taking any medications for this.

## 2019-06-11 NOTE — DISCUSSION/SUMMARY
[de-identified] : This patient has acute right knee pain.  Given her limited exam I would like to obtain an MRI of the right knee to better evaluate for ligamentous or tendon tears as well as intra-articular pathology.  Additionally I applied a [place] brace locked in extension and she can be weightbearing as tolerated with this brace.  I asked the patient to notify me after she is finished obtaining the MRI so that we we can review imaging over the telephone to discuss next steps.

## 2019-06-28 ENCOUNTER — APPOINTMENT (OUTPATIENT)
Dept: MRI IMAGING | Facility: CLINIC | Age: 64
End: 2019-06-28
Payer: COMMERCIAL

## 2019-06-28 ENCOUNTER — OUTPATIENT (OUTPATIENT)
Dept: OUTPATIENT SERVICES | Facility: HOSPITAL | Age: 64
LOS: 1 days | End: 2019-06-28
Payer: COMMERCIAL

## 2019-06-28 DIAGNOSIS — M25.561 PAIN IN RIGHT KNEE: ICD-10-CM

## 2019-06-28 DIAGNOSIS — Z98.890 OTHER SPECIFIED POSTPROCEDURAL STATES: Chronic | ICD-10-CM

## 2019-06-28 PROCEDURE — 73721 MRI JNT OF LWR EXTRE W/O DYE: CPT

## 2019-06-28 PROCEDURE — 73721 MRI JNT OF LWR EXTRE W/O DYE: CPT | Mod: 26,RT

## 2019-07-02 ENCOUNTER — APPOINTMENT (OUTPATIENT)
Dept: ORTHOPEDIC SURGERY | Facility: CLINIC | Age: 64
End: 2019-07-02
Payer: OTHER MISCELLANEOUS

## 2019-07-02 VITALS — HEIGHT: 61 IN | BODY MASS INDEX: 34.17 KG/M2 | WEIGHT: 181 LBS

## 2019-07-02 PROCEDURE — 99214 OFFICE O/P EST MOD 30 MIN: CPT | Mod: 25

## 2019-07-02 PROCEDURE — 20610 DRAIN/INJ JOINT/BURSA W/O US: CPT | Mod: RT

## 2019-07-02 RX ORDER — METFORMIN HYDROCHLORIDE 500 MG/1
500 TABLET, COATED ORAL
Refills: 0 | Status: ACTIVE | COMMUNITY

## 2019-07-02 RX ORDER — ATORVASTATIN CALCIUM 80 MG/1
TABLET, FILM COATED ORAL
Refills: 0 | Status: ACTIVE | COMMUNITY

## 2019-07-02 RX ORDER — NAPROXEN 500 MG/1
500 TABLET ORAL
Qty: 60 | Refills: 0 | Status: ACTIVE | COMMUNITY
Start: 2019-06-11 | End: 1900-01-01

## 2019-07-02 NOTE — HISTORY OF PRESENT ILLNESS
[de-identified] : This is very nice 64-year-old female experiencing acute right knee pain, which is severe in intensity.  This started after a fall 1 month ago.  She did not hit her head when she fell.  The pain substantially limits activities of daily living. Walking tolerance is reduced.  She has difficulty bending her knee.  A [place] brace has been helpful.  She does use a cane now.  The patient denies any radiation of the pain to the feet and it is not associated with numbness, tingling, or weakness.  She has pain all throughout the knee.  She is not taking any medications for this.  She is here to review her MRI.

## 2019-07-02 NOTE — DISCUSSION/SUMMARY
[de-identified] : This patient has acute right knee pain secondary to an acute complex meniscal tear involving the medial meniscus, body and posterior horn.  The patient is not an appropriate candidate for arthroscopic meniscectomy at this time. An extensive discussion was conducted on the natural history of the disease and the variety of surgical and non-surgical options available to the patient including, but not limited to non-steroidal anti-inflammatory medications, steroid injections, physical therapy, maintenance of ideal body weight, and reduction of activity.  Today we performed a right knee intra-articular cortisone injection which did help her significantly objectively.  The patient will schedule an appointment as needed in 6 to 8 weeks.\par \par Informed consent for the right knee injection was obtained. All questions were answered. A time out was performed. The right knee was prepped and draped in sterile fashion. Using sterile technique, the right knee was injection of 2cc of Kenalog, 4cc of 1% lidocaine, 2cc of 0.5% marcaine using a 21-gauge needle. A sterile dressing was applied. Post injection instructions were reviewed. The patient tolerated the procedure well.\par

## 2019-07-02 NOTE — PHYSICAL EXAM
[de-identified] : Patient is well nourished, well-developed, in no acute distress, with appropriate mood and affect. The patient is oriented to time, place, and person. Respirations are even and unlabored.  She is able to ambulate with a mildly antalgic gait with a cane.  There is no inguinal adenopathy. Examination of the contralateral knee shows normal range of motion, strength, no tenderness, and intact skin. The affected limb is well-perfused, without skin lesions, shows a grossly normal motor and sensory examination. Knee motion is significantly reduced and does cause significant pain. The right knee moves actively with significant encouragement from 0 to 120 degrees.  She is now able to perform a straight leg.  The knee is stable to AP and medial lateral compression.  The alignment of the knee is 5 degrees varus. Muscle strength is normal. Pedal pulses are palpable. Hip examination was negative.\par  [de-identified] : Patient brings her MRI of the right knee that I ordered for her.  The read is not available yet.  I reviewed the MRI myself and read this is demonstrating a complex tear of the medial meniscus involving the body and posterior horn.  Edema is noted in the patellofemoral joint particularly in the patella with cystic formation subchondral region.  Collateral ligaments appear intact in the cruciate ligaments appear intact.

## 2019-07-31 ENCOUNTER — APPOINTMENT (OUTPATIENT)
Dept: ORTHOPEDIC SURGERY | Facility: CLINIC | Age: 64
End: 2019-07-31
Payer: OTHER MISCELLANEOUS

## 2019-07-31 DIAGNOSIS — S83.231A COMPLEX TEAR OF MEDIAL MENISCUS, CURRENT INJURY, RIGHT KNEE, INITIAL ENCOUNTER: ICD-10-CM

## 2019-07-31 PROCEDURE — 99213 OFFICE O/P EST LOW 20 MIN: CPT | Mod: 25

## 2019-07-31 PROCEDURE — 20610 DRAIN/INJ JOINT/BURSA W/O US: CPT | Mod: RT

## 2019-08-08 NOTE — PHYSICAL EXAM
[de-identified] : Patient is well nourished, well-developed, in no acute distress, with appropriate mood and affect. The patient is oriented to time, place, and person. Respirations are even and unlabored.  She is able to ambulate with a mildly antalgic gait with a cane.  There is no inguinal adenopathy. Examination of the contralateral knee shows normal range of motion, strength, no tenderness, and intact skin. The affected limb is well-perfused, without skin lesions, shows a grossly normal motor and sensory examination. Knee motion is significantly reduced and does cause significant pain. The right knee motion is significantly limited due to the pain.  Initially she would only range her knee from 20 to 70 degrees but after an intra-articular lidocaine injection she was able to actively range knee from 0 to 110 degrees.  She is now able to perform a straight leg.  The knee is stable to AP and medial lateral compression.  The alignment of the knee is 5 degrees varus. Muscle strength is normal. Pedal pulses are palpable. Hip examination was negative.\par  [de-identified] : I have previously reviewed the patient's MRI with her of the right knee.  It demonstrates a complex medial meniscus tear involving the body and posterior horn.

## 2019-08-08 NOTE — PROCEDURE
[de-identified] : Informed consent for the right knee injection was obtained. All questions were answered. A time out was performed. The right knee was prepped and draped in sterile fashion. Using sterile technique, the right knee was injection of 5 cc of 1% lidocaine.  This was done for diagnostic purposes because before the injection she had a limited knee examination.  Her knee examination improved significantly after the injection.  A sterile dressing was applied. Post injection instructions were reviewed. The patient tolerated the procedure well.\par

## 2019-08-08 NOTE — DISCUSSION/SUMMARY
[de-identified] : This patient has a complex right knee medial meniscus tear that is causing significant symptoms.  She is a candidate for arthroscopic meniscectomy given that she is unable to return to work and the symptoms are significantly limiting her and she is failed conservative management.\par \par The patient is an appropriate candidate for consideration of right knee arthroscopy and meniscectomy. An extensive discussion was conducted of the natural history of the disease and the variety of surgical and non-surgical treatment options available to the patient. A risk/benefit analysis was discussed with the patient reviewing the advantages and disadvantages of surgical intervention at this time. Both the level of the patient's pain and his mechanical symptoms have made additional conservative treatment measures consisting of physical therapy, corticosteroids, and/or bracing contraindicated. A full explanation was given of the nature and the purpose of the procedure and anesthesia, its benefits, possible alternative methods of diagnosis or treatment, the risks involved, the possibility of complications, the foreseeable consequences of the procedure and the possible results of the non-treatment.\par \par No guarantee or assurance was made as to the results that may be obtained. Specifically, the risks were identified to include, but are not limited to the following: Infection, phlebitis, pulmonary embolism, death, paralysis, dislocation, pain, stiffness, instability, limp, weakness, breakage, uncontrolled bleeding, nerve injury, blood vessel injury, pressure sores, anesthetic risks, recurrence of meniscal tears, future development of osteoarthritis, and failure to eliminate mechanical symptoms. Further discussion was undertaken with the patient about the details of surgical preparation, treatment, and postoperative rehabilitation including medical clearance, the hospital course, and the postoperative rehabilitation involved. All in all, I feel that this patient is a good candidate for surgical intervention.\par \par She states that she is unable to return to work and so she will give me some paperwork to complete to keep her out of work until she is able to after her surgery.

## 2019-08-08 NOTE — HISTORY OF PRESENT ILLNESS
[de-identified] : This is very nice 64-year-old female returning after cortisone injection 7/2/19 for severe R knee pain secondary to complex meniscal tear diagnosed on MRI.  This was after a fall.  She states her pain improved after the injection and has remained better than prior to the injection but she does not feel that she is back to her normal baseline.  The pain is severe.  She is unable to return to work because the pain is so bad.  She states that her knee is still swollen.  She has been wearing a knee brace she bought on her own, she discontinued the [place] brace she obtained here last time because she could not bend the knee with it or drive.  She has been using Naproxen and Tramadol which help.  She has not tried physical therapy.  She has been using a cane on occasion.  She does not feel clicking / locking / catching.  She states that the leg gives way.  The pain substantially limits activities of daily living. Walking tolerance is reduced.

## 2019-10-02 ENCOUNTER — OUTPATIENT (OUTPATIENT)
Dept: OUTPATIENT SERVICES | Facility: HOSPITAL | Age: 64
LOS: 1 days | End: 2019-10-02
Payer: OTHER MISCELLANEOUS

## 2019-10-02 VITALS
DIASTOLIC BLOOD PRESSURE: 80 MMHG | HEART RATE: 62 BPM | RESPIRATION RATE: 16 BRPM | WEIGHT: 184.97 LBS | OXYGEN SATURATION: 99 % | SYSTOLIC BLOOD PRESSURE: 124 MMHG | HEIGHT: 62 IN | TEMPERATURE: 98 F

## 2019-10-02 DIAGNOSIS — M23.221 DERANGEMENT OF POSTERIOR HORN OF MEDIAL MENISCUS DUE TO OLD TEAR OR INJURY, RIGHT KNEE: ICD-10-CM

## 2019-10-02 DIAGNOSIS — M12.9 ARTHROPATHY, UNSPECIFIED: ICD-10-CM

## 2019-10-02 DIAGNOSIS — E11.9 TYPE 2 DIABETES MELLITUS WITHOUT COMPLICATIONS: ICD-10-CM

## 2019-10-02 DIAGNOSIS — Z98.890 OTHER SPECIFIED POSTPROCEDURAL STATES: Chronic | ICD-10-CM

## 2019-10-02 LAB
ANION GAP SERPL CALC-SCNC: 15 MMO/L — HIGH (ref 7–14)
BUN SERPL-MCNC: 8 MG/DL — SIGNIFICANT CHANGE UP (ref 7–23)
CALCIUM SERPL-MCNC: 9 MG/DL — SIGNIFICANT CHANGE UP (ref 8.4–10.5)
CHLORIDE SERPL-SCNC: 98 MMOL/L — SIGNIFICANT CHANGE UP (ref 98–107)
CO2 SERPL-SCNC: 27 MMOL/L — SIGNIFICANT CHANGE UP (ref 22–31)
CREAT SERPL-MCNC: 0.59 MG/DL — SIGNIFICANT CHANGE UP (ref 0.5–1.3)
GLUCOSE SERPL-MCNC: 261 MG/DL — HIGH (ref 70–99)
HBA1C BLD-MCNC: 9.1 % — HIGH (ref 4–5.6)
HCT VFR BLD CALC: 41.4 % — SIGNIFICANT CHANGE UP (ref 34.5–45)
HGB BLD-MCNC: 13.5 G/DL — SIGNIFICANT CHANGE UP (ref 11.5–15.5)
MCHC RBC-ENTMCNC: 30.2 PG — SIGNIFICANT CHANGE UP (ref 27–34)
MCHC RBC-ENTMCNC: 32.6 % — SIGNIFICANT CHANGE UP (ref 32–36)
MCV RBC AUTO: 92.6 FL — SIGNIFICANT CHANGE UP (ref 80–100)
NRBC # FLD: 0 K/UL — SIGNIFICANT CHANGE UP (ref 0–0)
PLATELET # BLD AUTO: 266 K/UL — SIGNIFICANT CHANGE UP (ref 150–400)
PMV BLD: 11.6 FL — SIGNIFICANT CHANGE UP (ref 7–13)
POTASSIUM SERPL-MCNC: 4.3 MMOL/L — SIGNIFICANT CHANGE UP (ref 3.5–5.3)
POTASSIUM SERPL-SCNC: 4.3 MMOL/L — SIGNIFICANT CHANGE UP (ref 3.5–5.3)
RBC # BLD: 4.47 M/UL — SIGNIFICANT CHANGE UP (ref 3.8–5.2)
RBC # FLD: 12.8 % — SIGNIFICANT CHANGE UP (ref 10.3–14.5)
SODIUM SERPL-SCNC: 140 MMOL/L — SIGNIFICANT CHANGE UP (ref 135–145)
WBC # BLD: 8.8 K/UL — SIGNIFICANT CHANGE UP (ref 3.8–10.5)
WBC # FLD AUTO: 8.8 K/UL — SIGNIFICANT CHANGE UP (ref 3.8–10.5)

## 2019-10-02 PROCEDURE — 93010 ELECTROCARDIOGRAM REPORT: CPT

## 2019-10-02 RX ORDER — GLYBURIDE-METFORMIN HYDROCHLORIDE 1.25; 25 MG/1; MG/1
2 TABLET ORAL
Qty: 0 | Refills: 0 | DISCHARGE

## 2019-10-02 RX ORDER — ASPIRIN/CALCIUM CARB/MAGNESIUM 324 MG
1 TABLET ORAL
Qty: 0 | Refills: 0 | DISCHARGE

## 2019-10-02 NOTE — H&P PST ADULT - ENDOCRINE COMMENTS
denies thyroid disease; DM type II -- only does finger sticks 2x/week denies thyroid disease; DM type II -- only does finger sticks 2x/week--has had erratic blood finger sticks in last 5 months due to 3 deaths in the family

## 2019-10-02 NOTE — H&P PST ADULT - HISTORY OF PRESENT ILLNESS
This is a 65 y/o female who presents with recent injury to her right knee after a fall. Pathology confirmed on xrays and MRI. Intervention recommended. Scheduled for right knee arthroscopy with medial meniscectomy on 10-11-19

## 2019-10-02 NOTE — H&P PST ADULT - NSICDXPROBLEM_GEN_ALL_CORE_FT
PROBLEM DIAGNOSES  Problem: Arthropathy  Assessment and Plan: This is a 63 y/o female who is scheduled for right knee arthroscopy with medial     Problem: Diabetes mellitus  Assessment and Plan: PROBLEM DIAGNOSES  Problem: Arthropathy  Assessment and Plan: This is a 63 y/o female who is scheduled for right knee arthroscopy with medial   * Given preop and cleanser instructions with good teach back and patient verbalized understanding    Problem: Diabetes mellitus  Assessment and Plan: Await medical evaluation with pcp due to erratic finger stick results  * Instructed to stop metformin 24 hours before surgery  * Instructed to take losartan - HCTZ the am of surgery  * Last dose of aspirin on 10-2-19

## 2019-10-02 NOTE — H&P PST ADULT - NEGATIVE ENMT SYMPTOMS
Surgical Progress Note    Author: Justus Will Date & Time created: 2018   8:28 AM     Interval Events:  Tolerating diet.  CTA scheduled to 7697-7488 today    Review of Systems   Constitutional: Negative for chills and fever.   Gastrointestinal: Negative for abdominal pain, nausea and vomiting.     Hemodynamics:  Temp (24hrs), Av.6 °C (97.8 °F), Min:36.3 °C (97.3 °F), Max:36.8 °C (98.2 °F)  Temperature: 36.3 °C (97.3 °F)  Pulse  Av.9  Min: 61  Max: 113   Blood Pressure: 144/81     Respiratory:    Respiration: 17, Pulse Oximetry: 97 %        RUL Breath Sounds: Clear, RML Breath Sounds: Clear, RLL Breath Sounds: Clear, PEG Breath Sounds: Clear, LLL Breath Sounds: Clear  Neuro:  GCS = 15       Fluids:    Intake/Output Summary (Last 24 hours) at 18 0828  Last data filed at 18 2350   Gross per 24 hour   Intake              720 ml   Output              900 ml   Net             -180 ml        Current Diet Order   Procedures   • Diet Order Low Fiber(GI Soft)     Physical Exam   Constitutional: She is oriented to person, place, and time. She appears well-developed and well-nourished. No distress.   HENT:   Head: Normocephalic.   Eyes: Pupils are equal, round, and reactive to light. No scleral icterus.   Cardiovascular: Normal rate.    Pulmonary/Chest: Effort normal. No respiratory distress.   Abdominal: Soft. Bowel sounds are normal. She exhibits no distension. There is no tenderness. There is no rebound and no guarding.   Neurological: She is alert and oriented to person, place, and time.     Labs:  No results found for this or any previous visit (from the past 24 hour(s)).  Medical Decision Making, by Problem:  1.  Recurrent diverticular abscess, now status drain placement, and replacement that was complicated by bleeding.  2.  Hypertension.  3.  Gastroesophageal reflux disease.  4.  Asthma.  5.  Urinary incontinence  6.  Hypokalemia        Plan:  CTA today  Continue IV antibiotics  Home  tomorrow or possibly this evening if drain out, otherwise doing well      Quality Measures:  Quality-Core Measures   Reviewed items::  Labs reviewed and Medications reviewed  Simms catheter::  No Simms  DVT prophylaxis - mechanical:  SCDs  Ulcer Prophylaxis::  Yes  Antibiotics:  Treating active infection/contamination beyond 24 hours perioperative coverage and Treating fecal contamination      Discussed patient condition with RN and Patient   no hearing difficulty/no nasal congestion

## 2019-10-02 NOTE — H&P PST ADULT - NSICDXPASTMEDICALHX_GEN_ALL_CORE_FT
PAST MEDICAL HISTORY:  Arthropathy right knee in October 2019    Diabetes type II diagnosed in approximately 2014    HLD (hyperlipidemia)     HTN (hypertension)     Obesity (BMI 30.0-34.9) PAST MEDICAL HISTORY:  Arthropathy right knee in October 2019--ambulates with cane    Diabetes type II diagnosed in approximately 2014    HLD (hyperlipidemia)     HTN (hypertension)     Obesity (BMI 30.0-34.9)

## 2019-11-02 PROBLEM — M12.9 ARTHROPATHY, UNSPECIFIED: Chronic | Status: ACTIVE | Noted: 2019-10-02

## 2019-11-02 PROBLEM — E11.9 TYPE 2 DIABETES MELLITUS WITHOUT COMPLICATIONS: Chronic | Status: ACTIVE | Noted: 2017-12-13

## 2019-11-06 ENCOUNTER — OUTPATIENT (OUTPATIENT)
Dept: OUTPATIENT SERVICES | Facility: HOSPITAL | Age: 64
LOS: 1 days | End: 2019-11-06

## 2019-11-06 VITALS
DIASTOLIC BLOOD PRESSURE: 90 MMHG | TEMPERATURE: 97 F | HEART RATE: 61 BPM | WEIGHT: 177.91 LBS | OXYGEN SATURATION: 99 % | SYSTOLIC BLOOD PRESSURE: 130 MMHG | RESPIRATION RATE: 18 BRPM | HEIGHT: 63 IN

## 2019-11-06 DIAGNOSIS — M23.221 DERANGEMENT OF POSTERIOR HORN OF MEDIAL MENISCUS DUE TO OLD TEAR OR INJURY, RIGHT KNEE: ICD-10-CM

## 2019-11-06 DIAGNOSIS — Z98.890 OTHER SPECIFIED POSTPROCEDURAL STATES: Chronic | ICD-10-CM

## 2019-11-06 DIAGNOSIS — I10 ESSENTIAL (PRIMARY) HYPERTENSION: ICD-10-CM

## 2019-11-06 DIAGNOSIS — E11.9 TYPE 2 DIABETES MELLITUS WITHOUT COMPLICATIONS: ICD-10-CM

## 2019-11-06 DIAGNOSIS — M23.329 OTHER MENISCUS DERANGEMENTS, POSTERIOR HORN OF MEDIAL MENISCUS, UNSPECIFIED KNEE: ICD-10-CM

## 2019-11-06 RX ORDER — SODIUM CHLORIDE 9 MG/ML
1000 INJECTION, SOLUTION INTRAVENOUS
Refills: 0 | Status: DISCONTINUED | OUTPATIENT
Start: 2019-11-08 | End: 2019-11-28

## 2019-11-06 NOTE — H&P PST ADULT - NSICDXPASTMEDICALHX_GEN_ALL_CORE_FT
PAST MEDICAL HISTORY:  Arthropathy right knee in October 2019--ambulates with cane    Diabetes type II diagnosed in approximately 2014    HLD (hyperlipidemia)     HTN (hypertension)     Obesity (BMI 30.0-34.9)

## 2019-11-06 NOTE — H&P PST ADULT - NEGATIVE OPHTHALMOLOGIC SYMPTOMS
no blurred vision R/no discharge R/no lacrimation R/no discharge L/no blurred vision L/no lacrimation L

## 2019-11-06 NOTE — H&P PST ADULT - RS GEN PE MLT RESP DETAILS PC
clear to auscultation bilaterally/breath sounds equal/no rhonchi/respirations non-labored/no rales/no wheezes

## 2019-11-06 NOTE — H&P PST ADULT - HISTORY OF PRESENT ILLNESS
This is a 65 y/o female who presents with recent injury to her right knee after a fall. Pathology confirmed on xrays and MRI. Intervention recommended. Scheduled for right knee arthroscopy with medial meniscectomy on 11/08/19. Of note this patient was rescheduled from October 2019 for endocrinology evaluation with hgbA1c 9.1.

## 2019-11-06 NOTE — H&P PST ADULT - GASTROINTESTINAL DETAILS
bowel sounds normal/no organomegaly/no guarding/soft/no bruit/no masses palpable/no rebound tenderness/no rigidity/no distention/nontender

## 2019-11-06 NOTE — H&P PST ADULT - NSICDXPROBLEM_GEN_ALL_CORE_FT
PROBLEM DIAGNOSES  Problem: Derangement of medial meniscus, posterior horn  Assessment and Plan: Scheduled for Arthroscopy Right Knee withMedial Menisectomy on 11/08/19.  Preop instructions provided and patient verbalizes understanding.  Labs done October 2019 results in [place], HgbA1c done on 10/30/19 result 7.9.  Famotidine provided with instructions. Hibiclens provided with instructions and was signed by patient. Teach-back method was utilized to assess patient's understanding. Patient verbalized understanding.      Problem: HTN (hypertension)  Assessment and Plan: Pt instructed to take med on the morning of procedure.    Problem: Diabetes mellitus  Assessment and Plan: Pt instructed last dose of Metformin 11/07/19 AM dose and no DM med on the morning of procedure.

## 2019-11-07 ENCOUNTER — TRANSCRIPTION ENCOUNTER (OUTPATIENT)
Age: 64
End: 2019-11-07

## 2019-11-07 NOTE — ASU PATIENT PROFILE, ADULT - PMH
Arthropathy  right knee in October 2019--ambulates with cane  Diabetes  type II diagnosed in approximately 2014  HLD (hyperlipidemia)    HTN (hypertension)    Obesity (BMI 30.0-34.9)

## 2019-11-07 NOTE — ASU PATIENT PROFILE, ADULT - VISION (WITH CORRECTIVE LENSES IF THE PATIENT USUALLY WEARS THEM):
Normal vision: sees adequately in most situations; can see medication labels, newsprint Ear Star Wedge Flap Text: The defect edges were debeveled with a #15 blade scalpel.  Given the location of the defect and the proximity to free margins (helical rim) an ear star wedge flap was deemed most appropriate.  Using a sterile surgical marker, the appropriate flap was drawn incorporating the defect and placing the expected incisions between the helical rim and antihelix where possible.  The area thus outlined was incised through and through with a #15 scalpel blade.

## 2019-11-08 ENCOUNTER — OUTPATIENT (OUTPATIENT)
Dept: OUTPATIENT SERVICES | Facility: HOSPITAL | Age: 64
LOS: 1 days | Discharge: HOME | End: 2019-11-08

## 2019-11-08 VITALS
HEART RATE: 67 BPM | OXYGEN SATURATION: 98 % | SYSTOLIC BLOOD PRESSURE: 119 MMHG | WEIGHT: 177.91 LBS | TEMPERATURE: 98 F | HEIGHT: 63 IN | RESPIRATION RATE: 15 BRPM | DIASTOLIC BLOOD PRESSURE: 59 MMHG

## 2019-11-08 VITALS
RESPIRATION RATE: 20 BRPM | TEMPERATURE: 98 F | OXYGEN SATURATION: 100 % | DIASTOLIC BLOOD PRESSURE: 72 MMHG | HEART RATE: 66 BPM | SYSTOLIC BLOOD PRESSURE: 121 MMHG

## 2019-11-08 DIAGNOSIS — M23.221 DERANGEMENT OF POSTERIOR HORN OF MEDIAL MENISCUS DUE TO OLD TEAR OR INJURY, RIGHT KNEE: ICD-10-CM

## 2019-11-08 DIAGNOSIS — Z98.890 OTHER SPECIFIED POSTPROCEDURAL STATES: Chronic | ICD-10-CM

## 2019-11-08 LAB — GLUCOSE BLDC GLUCOMTR-MCNC: 155 MG/DL — HIGH (ref 70–99)

## 2019-11-08 RX ORDER — FENTANYL CITRATE 50 UG/ML
50 INJECTION INTRAVENOUS
Refills: 0 | Status: DISCONTINUED | OUTPATIENT
Start: 2019-11-08 | End: 2019-11-08

## 2019-11-08 RX ORDER — ONDANSETRON 8 MG/1
4 TABLET, FILM COATED ORAL ONCE
Refills: 0 | Status: DISCONTINUED | OUTPATIENT
Start: 2019-11-08 | End: 2019-11-08

## 2019-11-08 RX ORDER — LOSARTAN/HYDROCHLOROTHIAZIDE 100MG-25MG
1 TABLET ORAL
Qty: 0 | Refills: 0 | DISCHARGE

## 2019-11-08 RX ORDER — OXYCODONE HYDROCHLORIDE 5 MG/1
5 TABLET ORAL ONCE
Refills: 0 | Status: DISCONTINUED | OUTPATIENT
Start: 2019-11-08 | End: 2019-11-08

## 2019-11-08 RX ORDER — KETOROLAC TROMETHAMINE 30 MG/ML
15 SYRINGE (ML) INJECTION ONCE
Refills: 0 | Status: DISCONTINUED | OUTPATIENT
Start: 2019-11-08 | End: 2019-11-08

## 2019-11-08 RX ORDER — FENTANYL CITRATE 50 UG/ML
25 INJECTION INTRAVENOUS
Refills: 0 | Status: DISCONTINUED | OUTPATIENT
Start: 2019-11-08 | End: 2019-11-08

## 2019-11-08 RX ORDER — HYDROMORPHONE HYDROCHLORIDE 2 MG/ML
0.5 INJECTION INTRAMUSCULAR; INTRAVENOUS; SUBCUTANEOUS
Refills: 0 | Status: DISCONTINUED | OUTPATIENT
Start: 2019-11-08 | End: 2019-11-08

## 2019-11-08 RX ORDER — METFORMIN HYDROCHLORIDE 850 MG/1
1 TABLET ORAL
Qty: 0 | Refills: 0 | DISCHARGE

## 2019-11-08 RX ORDER — OXYCODONE AND ACETAMINOPHEN 5; 325 MG/1; MG/1
1 TABLET ORAL EVERY 4 HOURS
Refills: 0 | Status: DISCONTINUED | OUTPATIENT
Start: 2019-11-08 | End: 2019-11-08

## 2019-11-08 RX ORDER — SODIUM CHLORIDE 9 MG/ML
1000 INJECTION, SOLUTION INTRAVENOUS
Refills: 0 | Status: DISCONTINUED | OUTPATIENT
Start: 2019-11-08 | End: 2019-11-28

## 2019-11-08 RX ORDER — OXYCODONE AND ACETAMINOPHEN 5; 325 MG/1; MG/1
2 TABLET ORAL EVERY 4 HOURS
Refills: 0 | Status: DISCONTINUED | OUTPATIENT
Start: 2019-11-08 | End: 2019-11-08

## 2019-11-08 RX ORDER — ONDANSETRON 8 MG/1
4 TABLET, FILM COATED ORAL ONCE
Refills: 0 | Status: DISCONTINUED | OUTPATIENT
Start: 2019-11-08 | End: 2019-11-28

## 2019-11-08 RX ORDER — ASPIRIN/CALCIUM CARB/MAGNESIUM 324 MG
1 TABLET ORAL
Qty: 42 | Refills: 0
Start: 2019-11-08 | End: 2019-12-19

## 2019-11-08 RX ORDER — SIMVASTATIN 20 MG/1
1 TABLET, FILM COATED ORAL
Qty: 0 | Refills: 0 | DISCHARGE

## 2019-11-08 RX ORDER — TRAMADOL HYDROCHLORIDE 50 MG/1
1 TABLET ORAL
Qty: 0 | Refills: 0 | DISCHARGE

## 2019-11-08 RX ORDER — ASPIRIN/CALCIUM CARB/MAGNESIUM 324 MG
0 TABLET ORAL
Qty: 0 | Refills: 0 | DISCHARGE

## 2019-11-08 RX ADMIN — HYDROMORPHONE HYDROCHLORIDE 0.5 MILLIGRAM(S): 2 INJECTION INTRAMUSCULAR; INTRAVENOUS; SUBCUTANEOUS at 09:45

## 2019-11-08 RX ADMIN — SODIUM CHLORIDE 125 MILLILITER(S): 9 INJECTION, SOLUTION INTRAVENOUS at 09:39

## 2019-11-08 RX ADMIN — FENTANYL CITRATE 25 MICROGRAM(S): 50 INJECTION INTRAVENOUS at 09:30

## 2019-11-08 RX ADMIN — HYDROMORPHONE HYDROCHLORIDE 0.5 MILLIGRAM(S): 2 INJECTION INTRAMUSCULAR; INTRAVENOUS; SUBCUTANEOUS at 10:00

## 2019-11-08 RX ADMIN — HYDROMORPHONE HYDROCHLORIDE 0.5 MILLIGRAM(S): 2 INJECTION INTRAMUSCULAR; INTRAVENOUS; SUBCUTANEOUS at 12:00

## 2019-11-08 RX ADMIN — HYDROMORPHONE HYDROCHLORIDE 0.5 MILLIGRAM(S): 2 INJECTION INTRAMUSCULAR; INTRAVENOUS; SUBCUTANEOUS at 11:45

## 2019-11-08 RX ADMIN — OXYCODONE HYDROCHLORIDE 5 MILLIGRAM(S): 5 TABLET ORAL at 11:00

## 2019-11-08 RX ADMIN — Medication 15 MILLIGRAM(S): at 09:35

## 2019-11-08 RX ADMIN — OXYCODONE HYDROCHLORIDE 5 MILLIGRAM(S): 5 TABLET ORAL at 10:30

## 2019-11-08 RX ADMIN — Medication 15 MILLIGRAM(S): at 09:50

## 2019-11-08 RX ADMIN — FENTANYL CITRATE 25 MICROGRAM(S): 50 INJECTION INTRAVENOUS at 09:20

## 2019-11-08 NOTE — ASU DISCHARGE PLAN (ADULT/PEDIATRIC) - ASU DC SPECIAL INSTRUCTIONSFT
Patient is to follow-up with Dr. Kam in 7-10 days. WBAT on right knee. Percocet for severe pain. Ecotrin 325 BID for 4 weeks for DVT ppx. Call office to schedule appointment. Dressing can be removed in 2 days and replaced bandaid over portals/stitches. Patient is to follow-up with Dr. Kam in 7-10 days. WBAT on right knee with knee immobilizer. Percocet for severe pain. Ecotrin 325 BID for 4 weeks for DVT ppx. Call office to schedule appointment. Dressing can be removed in 2 days and replaced bandaid over portals/stitches. Patient is to follow-up with Dr. Kam in 7-10 days. WBAT on right knee with knee immobilizer. Percocet for severe pain. Ecotrin 325 twice for 4 weeks for DVT prohpylaxis Call office to schedule appointment. Dressing can be removed in 2 days and place bandaid over portals/stitches.    Information about your recovery and anesthesia     Percocet Information Sheet

## 2019-11-08 NOTE — ASU DISCHARGE PLAN (ADULT/PEDIATRIC) - CARE PROVIDER_API CALL
Bret Kam)  [facility] Orthopaedic Surgery  [address] [place] [address], [facility] [address]  [place] [address]  Phone: [phone]  Fax: [phone]  Follow Up Time:

## 2019-11-08 NOTE — ASU DISCHARGE PLAN (ADULT/PEDIATRIC) - CALL YOUR DOCTOR IF YOU HAVE ANY OF THE FOLLOWING:
Swelling that gets worse/Nausea and vomiting that does not stop/Unable to urinate/Bleeding that does not stop/Pain not relieved by Medications/Wound/Surgical Site with redness, or foul smelling discharge or pus/Numbness, tingling, color or temperature change to extremity/Fever greater than (need to indicate Fahrenheit or Celsius)

## 2019-11-08 NOTE — ASU DISCHARGE PLAN (ADULT/PEDIATRIC) - NURSING INSTRUCTIONS
Patient is to follow-up with Dr. Kam in 7-10 days. WBAT on right knee. Percocet for severe pain. Ecotrin 325 BID for 4 weeks for DVT ppx. Call office to schedule appointment. Dressing can be removed in 2 days and replaced bandaid over portals/stitches. Patient is to follow-up with Dr. Kam in 7-10 days. WBAT on right knee with knee immobilizer. Percocet for severe pain. Ecotrin 325 BID for 4 weeks for DVT ppx. Call office to schedule appointment. Dressing can be removed in 2 days and replaced bandaid over portals/stitches. Patient is to follow-up with Dr. Kam in 7-10 days. WBAT on right knee with knee immobilizer. Percocet for severe pain. Ecotrin 325 BID for 4 weeks for DVT ppx. Call office to schedule appointment. Dressing can be removed in 2 days and replaced bandaid over portals/stitches.    You received IV Tylenol for pain management at 8:44 AM. Please DO NOT take any Tylenol (Acetaminophen) containing products, such as Vicodin, Percocet, Excedrin, and cold medications for the next 6 hours (until 2:44 PM). DO NOT TAKE MORE THAN 3000 MG OF TYLENOL in a 24 hour period.\\\  You received IV Toradol for pain management at 9:35 AM. Please DO NOT take Motrin/Ibuprofen/Advil/Aleve/NSAIDs (Non-Steroidal Anti-Inflammatory Drugs) for the next 6 hours (until 3:35  PM).///    DO NOT TAKE BOTH MEDICATIONS AT THE SAME TIME, SEPARATE BY 3 HOURS. Patient is to follow-up with Dr. Kam in 7-10 days. WBAT on right knee with knee immobilizer. Percocet for severe pain. Ecotrin 325 BID for 4 weeks for DVT ppx. Call office to schedule appointment. Dressing can be removed in 2 days and replaced bandaid over portals/stitches.pmeds   Do not take pain medication on an empty stomach.  Increase fluids and fiber in diet to prevent constipation.   Do not take and medications containing acetominophen within 4 hours of taking percocet.    You received IV Tylenol for pain management at 8:44 AM. Please DO NOT take any Tylenol (Acetaminophen) containing products, such as Vicodin, Percocet, Excedrin, and cold medications for the next 6 hours (until 2:44 PM). DO NOT TAKE MORE THAN 3000 MG OF TYLENOL in a 24 hour period.\\\  You received IV Toradol for pain management at 9:35 AM. Please DO NOT take Motrin/Ibuprofen/Advil/Aleve/NSAIDs (Non-Steroidal Anti-Inflammatory Drugs) for the next 6 hours (until 3:35  PM).///    DO NOT TAKE BOTH MEDICATIONS AT THE SAME TIME, SEPARATE BY 3 HOURS.

## 2020-01-04 ENCOUNTER — EMERGENCY (EMERGENCY)
Facility: HOSPITAL | Age: 65
LOS: 1 days | Discharge: HOME | End: 2020-01-04
Attending: EMERGENCY MEDICINE | Admitting: EMERGENCY MEDICINE
Payer: OTHER MISCELLANEOUS

## 2020-01-04 VITALS
OXYGEN SATURATION: 100 % | TEMPERATURE: 98 F | HEART RATE: 63 BPM | RESPIRATION RATE: 15 BRPM | DIASTOLIC BLOOD PRESSURE: 63 MMHG | SYSTOLIC BLOOD PRESSURE: 130 MMHG

## 2020-01-04 VITALS
RESPIRATION RATE: 16 BRPM | DIASTOLIC BLOOD PRESSURE: 69 MMHG | TEMPERATURE: 98 F | SYSTOLIC BLOOD PRESSURE: 139 MMHG | OXYGEN SATURATION: 100 % | HEART RATE: 56 BPM

## 2020-01-04 DIAGNOSIS — Z98.890 OTHER SPECIFIED POSTPROCEDURAL STATES: Chronic | ICD-10-CM

## 2020-01-04 LAB
ALBUMIN SERPL ELPH-MCNC: 4.2 G/DL — SIGNIFICANT CHANGE UP (ref 3.3–5)
ALP SERPL-CCNC: 60 U/L — SIGNIFICANT CHANGE UP (ref 40–120)
ALT FLD-CCNC: 15 U/L — SIGNIFICANT CHANGE UP (ref 4–33)
ANION GAP SERPL CALC-SCNC: 17 MMO/L — HIGH (ref 7–14)
AST SERPL-CCNC: 25 U/L — SIGNIFICANT CHANGE UP (ref 4–32)
BASOPHILS # BLD AUTO: 0.06 K/UL — SIGNIFICANT CHANGE UP (ref 0–0.2)
BASOPHILS NFR BLD AUTO: 0.6 % — SIGNIFICANT CHANGE UP (ref 0–2)
BILIRUB SERPL-MCNC: 0.2 MG/DL — SIGNIFICANT CHANGE UP (ref 0.2–1.2)
BUN SERPL-MCNC: 14 MG/DL — SIGNIFICANT CHANGE UP (ref 7–23)
CALCIUM SERPL-MCNC: 9.5 MG/DL — SIGNIFICANT CHANGE UP (ref 8.4–10.5)
CHLORIDE SERPL-SCNC: 101 MMOL/L — SIGNIFICANT CHANGE UP (ref 98–107)
CO2 SERPL-SCNC: 25 MMOL/L — SIGNIFICANT CHANGE UP (ref 22–31)
CREAT SERPL-MCNC: 0.58 MG/DL — SIGNIFICANT CHANGE UP (ref 0.5–1.3)
CRP SERPL-MCNC: < 4 MG/L — SIGNIFICANT CHANGE UP
EOSINOPHIL # BLD AUTO: 0.41 K/UL — SIGNIFICANT CHANGE UP (ref 0–0.5)
EOSINOPHIL NFR BLD AUTO: 4 % — SIGNIFICANT CHANGE UP (ref 0–6)
ERYTHROCYTE [SEDIMENTATION RATE] IN BLOOD: 13 MM/HR — SIGNIFICANT CHANGE UP (ref 4–25)
GLUCOSE SERPL-MCNC: 80 MG/DL — SIGNIFICANT CHANGE UP (ref 70–99)
HCT VFR BLD CALC: 44.5 % — SIGNIFICANT CHANGE UP (ref 34.5–45)
HGB BLD-MCNC: 14.4 G/DL — SIGNIFICANT CHANGE UP (ref 11.5–15.5)
IMM GRANULOCYTES NFR BLD AUTO: 0.2 % — SIGNIFICANT CHANGE UP (ref 0–1.5)
LYMPHOCYTES # BLD AUTO: 5.36 K/UL — HIGH (ref 1–3.3)
LYMPHOCYTES # BLD AUTO: 52.3 % — HIGH (ref 13–44)
MCHC RBC-ENTMCNC: 29.8 PG — SIGNIFICANT CHANGE UP (ref 27–34)
MCHC RBC-ENTMCNC: 32.4 % — SIGNIFICANT CHANGE UP (ref 32–36)
MCV RBC AUTO: 92.1 FL — SIGNIFICANT CHANGE UP (ref 80–100)
MONOCYTES # BLD AUTO: 0.65 K/UL — SIGNIFICANT CHANGE UP (ref 0–0.9)
MONOCYTES NFR BLD AUTO: 6.3 % — SIGNIFICANT CHANGE UP (ref 2–14)
NEUTROPHILS # BLD AUTO: 3.75 K/UL — SIGNIFICANT CHANGE UP (ref 1.8–7.4)
NEUTROPHILS NFR BLD AUTO: 36.6 % — LOW (ref 43–77)
NRBC # FLD: 0 K/UL — SIGNIFICANT CHANGE UP (ref 0–0)
PLATELET # BLD AUTO: 282 K/UL — SIGNIFICANT CHANGE UP (ref 150–400)
PMV BLD: 10.7 FL — SIGNIFICANT CHANGE UP (ref 7–13)
POTASSIUM SERPL-MCNC: 4.1 MMOL/L — SIGNIFICANT CHANGE UP (ref 3.5–5.3)
POTASSIUM SERPL-SCNC: 4.1 MMOL/L — SIGNIFICANT CHANGE UP (ref 3.5–5.3)
PROT SERPL-MCNC: 7.6 G/DL — SIGNIFICANT CHANGE UP (ref 6–8.3)
RBC # BLD: 4.83 M/UL — SIGNIFICANT CHANGE UP (ref 3.8–5.2)
RBC # FLD: 13.1 % — SIGNIFICANT CHANGE UP (ref 10.3–14.5)
SODIUM SERPL-SCNC: 143 MMOL/L — SIGNIFICANT CHANGE UP (ref 135–145)
WBC # BLD: 10.25 K/UL — SIGNIFICANT CHANGE UP (ref 3.8–10.5)
WBC # FLD AUTO: 10.25 K/UL — SIGNIFICANT CHANGE UP (ref 3.8–10.5)

## 2020-01-04 PROCEDURE — 99053 MED SERV 10PM-8AM 24 HR FAC: CPT

## 2020-01-04 PROCEDURE — 99285 EMERGENCY DEPT VISIT HI MDM: CPT

## 2020-01-04 PROCEDURE — 93971 EXTREMITY STUDY: CPT | Mod: 26,LT

## 2020-01-04 RX ORDER — ACETAMINOPHEN 500 MG
975 TABLET ORAL ONCE
Refills: 0 | Status: COMPLETED | OUTPATIENT
Start: 2020-01-04 | End: 2020-01-04

## 2020-01-04 RX ORDER — OXYCODONE HYDROCHLORIDE 5 MG/1
5 TABLET ORAL ONCE
Refills: 0 | Status: DISCONTINUED | OUTPATIENT
Start: 2020-01-04 | End: 2020-01-04

## 2020-01-04 RX ORDER — MORPHINE SULFATE 50 MG/1
4 CAPSULE, EXTENDED RELEASE ORAL ONCE
Refills: 0 | Status: DISCONTINUED | OUTPATIENT
Start: 2020-01-04 | End: 2020-01-04

## 2020-01-04 RX ORDER — IBUPROFEN 200 MG
600 TABLET ORAL ONCE
Refills: 0 | Status: COMPLETED | OUTPATIENT
Start: 2020-01-04 | End: 2020-01-04

## 2020-01-04 RX ADMIN — Medication 600 MILLIGRAM(S): at 11:41

## 2020-01-04 RX ADMIN — MORPHINE SULFATE 4 MILLIGRAM(S): 50 CAPSULE, EXTENDED RELEASE ORAL at 12:30

## 2020-01-04 RX ADMIN — OXYCODONE HYDROCHLORIDE 5 MILLIGRAM(S): 5 TABLET ORAL at 07:51

## 2020-01-04 RX ADMIN — Medication 975 MILLIGRAM(S): at 11:42

## 2020-01-04 NOTE — ED PROVIDER NOTE - PROGRESS NOTE DETAILS
Matt Gong MD. US not showing evidence of DVT. Had extensive conversation regarding pain management and need to follow up with pt's orthopedist. Return precautions given. All questions answered. patient felt she could not go home because of pain.  LAbs were checked to eval for any infectious process - esr and CRP WNL and patient able to participate in passive ROM with knee.  Pt endorses that she has been taking her husbands oxycodone because her's ran out.  Pt was told to take naproxen by her surgeon.  Will discharge patient with out patient follow up.  - Brandee Wagoner,

## 2020-01-04 NOTE — ED ADULT TRIAGE NOTE - CHIEF COMPLAINT QUOTE
pt reports pain at right knee x 1 month. no relief from pain meds. surgery at site on 11/8. pt ambulating on crutches

## 2020-01-04 NOTE — ED PROVIDER NOTE - NS ED ROS FT
Constitutional: no fevers or chills  HEENT: no visual changes, no sore throat, no rhinorrhea  CV: no cp or palpitations  Resp: no sob or cough  GI: no abd pain, no nausea, no vomiting, no diarrhea, no constipation  : no dysuria, no hematuria  MSK: R knee pain  skin: no rashes  neuro: no HA, no numbness; no weakness, no tingling  ROS statement: all other ROS negative except as per HPI

## 2020-01-04 NOTE — ED PROVIDER NOTE - PATIENT PORTAL LINK FT
You can access the FollowMyHealth Patient Portal offered by [facility] by registering at the following website: [email or web address]. By joining [employer]’s FollowMyHealth portal, you will also be able to view your health information using other applications (apps) compatible with our system. You can access the FollowMyHealth Patient Portal offered by [facility] by registering at the following website: [email or web address]. By joining [employer]’s FollowMyHealth portal, you will also be able to view your health information using other applications (apps) compatible with our system.

## 2020-01-04 NOTE — ED PROVIDER NOTE - CLINICAL SUMMARY MEDICAL DECISION MAKING FREE TEXT BOX
65 yo F, had meniscal tear from fall in June 2019, s/p R knee arthoscopy 11/18/19, presents to ED c/o R knee pain x1 month, worsening last night. Denies fever, chills, n/v, new injury/trauma. Able to walk with leg brace and crutches. Exam and vitals as above. Low suspicion for septic joint as pt can range the knee passively. Will obtain US to eval for DVT of leg. Will provide analgesia, reassess.

## 2020-01-04 NOTE — ED PROVIDER NOTE - ATTENDING CONTRIBUTION TO CARE
65 y/o F with h/o HTN, DM, recent R meniscal tear s/p surgical repair in Nov 2019.  She p/w R knee pain.  No new fall or injuries, but reports that the pain has been persistent since the surgery in November.  It was initially controlled with oxycodone, but she finished her prescription and when it was refilled by her orthopedist it was not covered by worker's comp, so she has only been taking naproxen for pain.  She was seen by orthopedic surgeon yesterday and told that he would schedule for a repeat MRI for evaluation.  No fever, chills, rash, cp, sob, palpitations.  Well appearing, lying comfortably in stretcher, awake and alert, nontoxic.  VSS.  Lungs cta bl.  Cards nl S1/S2, RRR, no MRG.  Abd soft ntnd.  RLE edema, nonpitting, ROM limited by pain, but patient is able to actively and passively range the knee.  (+)diffusely tender to palpation, no warmth to skin, rash, crepitus or fluctuance.  There is no concern for cellulitis or joint infection.  Plan for pain control, rle dvt study, no new trauma or injury to necessitate cross-sectional imaging and will defer to outpatient ortho for MRI and further eval of chronic pain/post-surgical pain.

## 2020-01-04 NOTE — ED ADULT NURSE NOTE - INTERVENTIONS DEFINITIONS
Non-slip footwear when patient is off stretcher/Physically safe environment: no spills, clutter or unnecessary equipment/Call bell, personal items and telephone within reach/Monitor gait and stability/[place] to call system/Instruct patient to call for assistance/Stretcher in lowest position, wheels locked, appropriate side rails in place

## 2020-01-04 NOTE — ED ADULT NURSE NOTE - OBJECTIVE STATEMENT
Pt arrives for right knee and leg pain, and swelling to the right leg from the knee down. Pt states she fell at work this summer and her RLE, and had surgery a month ago to repair it. She was written for an oxy script to go home with, but her insurance did cover it, so she has been taking naproxen w/o relief for the pain. She states she hasn't slept in 3 days r/t pain. Denies chest pain and SOB.   HX DM, HTN, high cholesterol  Pedal pulses palpable. Meds given, will continue to monitor

## 2020-01-04 NOTE — ED PROVIDER NOTE - OBJECTIVE STATEMENT
65 yo F PMHx DM, HTN, HLD, s/p R knee arthoscopy for a right medial meniscus tear 11/18/19 after she fell in June 2019, presents to the ED c/o right knee pain x1 month, gradually worsening and presents today because of severe pain. Pt has been using leg brace and crutches to ambulate. Pt has not been bed bound. Pt states pain has been under control with oxy but she ran out about 1 month ago. Has been attempting naproxen without relief, last took about 15 minutes prior to arrival. Pt denies fever, chills, n/v, new injury/trauma to right knee, numbness, weakness, cp, sob.

## 2020-01-23 ENCOUNTER — EMERGENCY (EMERGENCY)
Facility: HOSPITAL | Age: 65
LOS: 1 days | Discharge: HOME | End: 2020-01-23
Attending: EMERGENCY MEDICINE | Admitting: EMERGENCY MEDICINE
Payer: OTHER MISCELLANEOUS

## 2020-01-23 VITALS
DIASTOLIC BLOOD PRESSURE: 78 MMHG | OXYGEN SATURATION: 99 % | SYSTOLIC BLOOD PRESSURE: 120 MMHG | RESPIRATION RATE: 16 BRPM | HEART RATE: 75 BPM | TEMPERATURE: 98 F

## 2020-01-23 VITALS
DIASTOLIC BLOOD PRESSURE: 73 MMHG | HEART RATE: 72 BPM | RESPIRATION RATE: 16 BRPM | OXYGEN SATURATION: 98 % | SYSTOLIC BLOOD PRESSURE: 117 MMHG | TEMPERATURE: 98 F

## 2020-01-23 DIAGNOSIS — Z98.890 OTHER SPECIFIED POSTPROCEDURAL STATES: Chronic | ICD-10-CM

## 2020-01-23 PROCEDURE — 99284 EMERGENCY DEPT VISIT MOD MDM: CPT

## 2020-01-23 RX ORDER — MORPHINE SULFATE 50 MG/1
4 CAPSULE, EXTENDED RELEASE ORAL ONCE
Refills: 0 | Status: DISCONTINUED | OUTPATIENT
Start: 2020-01-23 | End: 2020-01-23

## 2020-01-23 RX ADMIN — MORPHINE SULFATE 4 MILLIGRAM(S): 50 CAPSULE, EXTENDED RELEASE ORAL at 23:00

## 2020-01-23 RX ADMIN — MORPHINE SULFATE 4 MILLIGRAM(S): 50 CAPSULE, EXTENDED RELEASE ORAL at 22:38

## 2020-01-23 NOTE — ED PROVIDER NOTE - ATTENDING CONTRIBUTION TO CARE
I, Jennifer Cabot, MD, have performed a history and physical exam of the patient and discussed their management with the ACP.  I reviewed the PA's note and agree with the documented findings and plan of care. My medical decision making and observations are found above.    Cabot: 64F 3 months s/p R knee arthroscopy, here with continued pain.  Recent investigations showed no sign of infection or other issue.  The patient had an MRI today and will be following up with her orthopedist this coming Wednesday.  She reports the pain as constant and severe, with intermittent edema.  The pain is limited to the inner aspect of the knee.  She has had no further trauma since her fall in nov.  She denies F/C.  On exam, HDS, NAD, MMM, eyes clear, R knee with well healed scars, no edema, no erythema or warmth, + full passive ROM and 30 degrees active ROM, TTP at the medial knee only, skin normal temperature and color, neuro: alert and oriented, no focal deficits.  Concern for complex regional pain syndrome vs meniscus tear. I, Jennifer Cabot, MD, have performed a history and physical exam of the patient and discussed their management with the ACP.  I reviewed the PA's note and agree with the documented findings and plan of care. My medical decision making and observations are found above.    Cabot: 64F 3 months s/p R knee arthroscopy, here with continued pain.  Recent investigations showed no sign of infection, DVT, or other issue.  The patient had an MRI today and will be following up with her orthopedist this coming Wednesday.  She reports the pain as constant and severe, with intermittent edema.  The pain is limited to the inner aspect of the knee.  She has had no further trauma since her fall in nov.  She denies F/C.  On exam, HDS, NAD, MMM, eyes clear, R knee with well healed scars, no edema, no erythema or warmth, + full passive ROM and 30 degrees active ROM, TTP at the medial knee only, skin normal temperature and color, neuro: alert and oriented, no focal deficits.  Concern for complex regional pain syndrome vs recurrent meniscus tear.  Will refer to a pain specialist as well as to Dr. Kam, her orthopedist.

## 2020-01-23 NOTE — ED PROVIDER NOTE - CLINICAL SUMMARY MEDICAL DECISION MAKING FREE TEXT BOX
Concern for complex regional pain syndrome vs recurrent meniscus tear.  Will refer to a pain specialist as well as to Dr. Kam, her orthopedist.

## 2020-01-23 NOTE — ED PROVIDER NOTE - NSFOLLOWUPINSTRUCTIONS_ED_ALL_ED_FT
You have been seen for knee pain.  We are concerned for Complex Regional Pain Syndrome.  This is a complex problem that must be treated by a pain specialist sooner rather than later.  Please call [phone] to ask for the next available appointment for a pain specialist.    Information on Complex Regional Pain Syndrome is below:    Complex regional pain syndrome (CRPS) is a disorder in which pain, occurring spontaneously or from a sensory stimulus, is disproportionately far more painful than it should be. An example of this would be light touching of the skin, which normally is not painful, yet it causes extreme pain perception in CRPS patients. The disproportionate pain is also reflected in normally painful stimuli, such as a pinprick, hurting more than it should (hyperalgesia). CRPS usually affects one limb after a limb injury or surgery. Usually patients with CRPS will experience limited use of their affected limbs due to the pain. Besides increased perception of pain, other signs and symptoms that are seen with CRPS particularly in its early stages are a warm, red, and swollen extremity on the affected side. CRPS is often treated with physical and occupational therapy, and the patient will typically require medications or other interventions to manage the pain.

## 2020-01-23 NOTE — ED PROVIDER NOTE - OBJECTIVE STATEMENT
63 y/o F s/p 3 months R knee arthroscopy p/w continued pain of R knee. Recently seen in ED for similar pain with negative work up including labs and venous duplex for DVT. Pt had an MRI today and will be following up with her orthopedist on Wednesday. Pt reports the pain as constant and severe, with intermittent edema.  Pain limited to the inner aspect of the knee.  She has had no further trauma since her fall in nov.  No fever, chills. No redness or signs of infection.

## 2020-01-23 NOTE — ED PROVIDER NOTE - MUSCULOSKELETAL, MLM
R knee with minimal swelling to medial knee. Normal passive ROM. Limited active ROM. No erythema or warmth. No signs of infection.

## 2020-01-23 NOTE — ED PROVIDER NOTE - PATIENT PORTAL LINK FT
You can access the FollowMyHealth Patient Portal offered by [facility] by registering at the following website: [email or web address]. By joining [facility]’s FollowMyHealth portal, you will also be able to view your health information using other applications (apps) compatible with our system.

## 2020-01-23 NOTE — ED ADULT TRIAGE NOTE - CHIEF COMPLAINT QUOTE
alert c/o severe right knee pain had arthroscopic surgery on right knee 11/11/19  taking oxycodone and using ice at home with no effect   hx HTN high cholesterol DM

## 2021-05-21 ENCOUNTER — EMERGENCY (EMERGENCY)
Facility: HOSPITAL | Age: 66
LOS: 1 days | Discharge: HOME | End: 2021-05-21
Attending: EMERGENCY MEDICINE | Admitting: EMERGENCY MEDICINE
Payer: OTHER MISCELLANEOUS

## 2021-05-21 VITALS
SYSTOLIC BLOOD PRESSURE: 119 MMHG | HEART RATE: 60 BPM | OXYGEN SATURATION: 100 % | HEIGHT: 63 IN | DIASTOLIC BLOOD PRESSURE: 57 MMHG | TEMPERATURE: 98 F | RESPIRATION RATE: 18 BRPM

## 2021-05-21 VITALS
HEART RATE: 57 BPM | TEMPERATURE: 98 F | RESPIRATION RATE: 18 BRPM | DIASTOLIC BLOOD PRESSURE: 63 MMHG | SYSTOLIC BLOOD PRESSURE: 102 MMHG | OXYGEN SATURATION: 100 %

## 2021-05-21 DIAGNOSIS — Z98.890 OTHER SPECIFIED POSTPROCEDURAL STATES: Chronic | ICD-10-CM

## 2021-05-21 PROCEDURE — 73030 X-RAY EXAM OF SHOULDER: CPT | Mod: 26,RT

## 2021-05-21 PROCEDURE — 70450 CT HEAD/BRAIN W/O DYE: CPT | Mod: 26

## 2021-05-21 PROCEDURE — 73080 X-RAY EXAM OF ELBOW: CPT | Mod: 26,RT

## 2021-05-21 PROCEDURE — 71046 X-RAY EXAM CHEST 2 VIEWS: CPT | Mod: 26

## 2021-05-21 PROCEDURE — 73110 X-RAY EXAM OF WRIST: CPT | Mod: 26,RT

## 2021-05-21 PROCEDURE — 99284 EMERGENCY DEPT VISIT MOD MDM: CPT

## 2021-05-21 PROCEDURE — 73562 X-RAY EXAM OF KNEE 3: CPT | Mod: 26,RT

## 2021-05-21 PROCEDURE — 73060 X-RAY EXAM OF HUMERUS: CPT | Mod: 26,RT

## 2021-05-21 PROCEDURE — 73200 CT UPPER EXTREMITY W/O DYE: CPT | Mod: 26,RT

## 2021-05-21 PROCEDURE — 72125 CT NECK SPINE W/O DYE: CPT | Mod: 26

## 2021-05-21 RX ORDER — ACETAMINOPHEN 500 MG
975 TABLET ORAL ONCE
Refills: 0 | Status: COMPLETED | OUTPATIENT
Start: 2021-05-21 | End: 2021-05-21

## 2021-05-21 RX ORDER — IBUPROFEN 200 MG
600 TABLET ORAL ONCE
Refills: 0 | Status: COMPLETED | OUTPATIENT
Start: 2021-05-21 | End: 2021-05-21

## 2021-05-21 RX ORDER — DIAZEPAM 5 MG
2 TABLET ORAL ONCE
Refills: 0 | Status: DISCONTINUED | OUTPATIENT
Start: 2021-05-21 | End: 2021-05-21

## 2021-05-21 RX ORDER — OXYCODONE HYDROCHLORIDE 5 MG/1
5 TABLET ORAL ONCE
Refills: 0 | Status: DISCONTINUED | OUTPATIENT
Start: 2021-05-21 | End: 2021-05-21

## 2021-05-21 RX ORDER — DIAZEPAM 5 MG
1 TABLET ORAL
Qty: 6 | Refills: 0
Start: 2021-05-21 | End: 2021-05-22

## 2021-05-21 RX ORDER — OXYCODONE HYDROCHLORIDE 5 MG/1
1 TABLET ORAL
Qty: 12 | Refills: 0
Start: 2021-05-21 | End: 2021-05-23

## 2021-05-21 RX ORDER — LIDOCAINE 4 G/100G
1 CREAM TOPICAL ONCE
Refills: 0 | Status: COMPLETED | OUTPATIENT
Start: 2021-05-21 | End: 2021-05-21

## 2021-05-21 RX ADMIN — OXYCODONE HYDROCHLORIDE 5 MILLIGRAM(S): 5 TABLET ORAL at 15:25

## 2021-05-21 RX ADMIN — Medication 975 MILLIGRAM(S): at 14:41

## 2021-05-21 RX ADMIN — OXYCODONE HYDROCHLORIDE 5 MILLIGRAM(S): 5 TABLET ORAL at 21:17

## 2021-05-21 RX ADMIN — Medication 600 MILLIGRAM(S): at 21:16

## 2021-05-21 RX ADMIN — LIDOCAINE 1 PATCH: 4 CREAM TOPICAL at 15:25

## 2021-05-21 RX ADMIN — Medication 600 MILLIGRAM(S): at 15:25

## 2021-05-21 RX ADMIN — Medication 975 MILLIGRAM(S): at 21:16

## 2021-05-21 RX ADMIN — Medication 2 MILLIGRAM(S): at 19:44

## 2021-05-21 NOTE — ED PROVIDER NOTE - OBJECTIVE STATEMENT
66F PMH HTN, HLD, DM, on ASA, s/p R knee arthroscopic surgery 1yr ago p/w R shoulder pain s/p mechanical fall 2hrs PTA. Pt states she was getting a check-up with her orthopedic surgeon at the office, and when getting up from a chair, her R knee buckled, causing her to fall onto her R side, hitting her R shoulder on a weighing scale. Pt unsure if she hit her head or lost consciousness. Denies headache, visual changes, numbness, tingling, weakness. Was helped up after the fall, but has been able to ambulate since the fall.

## 2021-05-21 NOTE — ED PROVIDER NOTE - ATTENDING CONTRIBUTION TO CARE
Dr. Hayes: 67 yo female with DM, HTN, HLD, on ASA, s/p right knee arthroscopy in 2020, in ED with pain to right UE and right knee after mechanical fall.  Was at her orthopedic office for follow up of right knee when she had mechanical fall, landed on right shoulder.  She denies LOC, unclear if hit head.  On exam pt in pain.  Right knee moderate generalized swelling and TTP superior to patella.  Right UE normal appearing with significant TTP over right posterior shoulder and right elbow.  Neurovascularly intact. Midline cervical/thoracic/lumbosacral spine without bony TTP or step off.

## 2021-05-21 NOTE — ED PROVIDER NOTE - CLINICAL SUMMARY MEDICAL DECISION MAKING FREE TEXT BOX
Miguelangel, PGY2 - 66F on ASA p/w R shoulder pain s/p mechanical fall 2hrs PTA. Neurovascularly intact. Will eval for ICH, fracture, dislocation. Plan: CT head/C-spine, xrays, pain control, reeval

## 2021-05-21 NOTE — ED PROVIDER NOTE - PROGRESS NOTE DETAILS
Miguelangel, PGY2 - Pt still with 9/10 pain, crying. Will give further meds, reeval. Xrays negative, will pursue CT shoulder. Miguelangel, PGY2 – Pt was re-evaluated at bedside, VSS, feeling better overall. States that Valium helped the most with her pain. Comfortable with DC at this time. Results were discussed with patient as well as return precautions and follow up plan with PCP and/or specialist. Has her own orthopedic surgeon. Time was taken to answer any questions that the patient had before providing them with discharge paperwork.

## 2021-05-21 NOTE — ED PROVIDER NOTE - PHYSICAL EXAMINATION
I have reviewed the triage vital signs.  Const: AAOx3, in NAD  Eyes: no conjunctival injection, PERRL, EOMI  HENT: NCAT, Neck supple, oral mucosa moist  CV: RRR, +S1, S2  Resp: CTAB, no respiratory distress  GI: Abdomen soft, NTND, no guarding, no CVA tenderness  Extremities: No peripheral edema,  2+ radial and DP pulses  Skin: Warm, well perfused, no rash  MSK: No midline tenderness. R shoulder: no swelling, limited ROM 2/2 pain, TTP anterior aspect. R wrist with mild swelling, nontender. R knee TTP  Neuro: No focal sensory or motor deficits  Psych: Appropriate mood and affect

## 2021-05-21 NOTE — ED ADULT NURSE NOTE - OBJECTIVE STATEMENT
Pt received to intake 4 presents s/p slip and fall at the PMD office earlier today. Pt a&ox3, ambulatory at baseline. Pt endorses hx of rt knee surgery last yr, states " I was walking near the scale in the office when my knee buckled and I fell hitting my rt shoulder". Pt arrives In [facility] Freddy bashir at bedside assessing pt, will continue to monitor.

## 2021-05-21 NOTE — ED PROVIDER NOTE - NSFOLLOWUPINSTRUCTIONS_ED_ALL_ED_FT
Follow up with your orthopedic surgeon.       Musculoskeletal Pain    Musculoskeletal pain refers to aches and pains in your bones, joints, muscles, and the tissues that surround them. This pain can occur in any part of the body. It can last for a short time (acute) or a long time (chronic).    A physical exam, lab tests, and imaging studies may be done to find the cause of your musculoskeletal pain.    Follow these instructions at home:     Lifestyle   •Try to control or lower your stress levels. Stress increases muscle tension and can worsen musculoskeletal pain. It is important to recognize when you are anxious or stressed and learn ways to manage it. This may include:  •Meditation or yoga.    •Cognitive or behavioral therapy.    •Acupuncture or massage therapy.    •You may continue all activities unless the activities cause more pain. When the pain gets better, slowly resume your normal activities. Gradually increase the intensity and duration of your activities or exercise.    Managing pain, stiffness, and swelling     •Take over-the-counter and prescription medicines only as told by your health care provider.    •When your pain is severe, bed rest may be helpful. Lie or sit in any position that is comfortable, but get out of bed and walk around at least every couple of hours.    •If directed, apply heat to the affected area as often as told by your health care provider. Use the heat source that your health care provider recommends, such as a moist heat pack or a heating pad.  •Place a towel between your skin and the heat source.    •Leave the heat on for 20–30 minutes.    •Remove the heat if your skin turns bright red. This is especially important if you are unable to feel pain, heat, or cold. You may have a greater risk of getting burned.      •If directed, put ice on the painful area.  •Put ice in a plastic bag.    •Place a towel between your skin and the bag.      •Leave the ice on for 20 minutes, 2–3 times a day.    General instructions     •Your health care provider may recommend that you see a physical therapist. This person can help you come up with a safe exercise program. Do any exercises as told by your physical therapist.    •Keep all follow-up visits, including any physical therapy visits, as told by your health care providers. This is important.    Contact a health care provider if:    •Your pain gets worse.    •Medicines do not help ease your pain.    •You cannot use the part of your body that hurts, such as your arm, leg, or neck.    •You have trouble sleeping.    •You have trouble doing your normal activities.    Get help right away if:  •You have a new injury and your pain is worse or different.  •You feel numb or you have tingling in the painful area.      Summary    •Musculoskeletal pain refers to aches and pains in your bones, joints, muscles, and the tissues that surround them.    •This pain can occur in any part of the body.    •Your health care provider may recommend that you see a physical therapist. This person can help you come up with a safe exercise program. Do any exercises as told by your physical therapist.    •Lower your stress level. Stress can worsen musculoskeletal pain. Ways to lower stress may include meditation, yoga, cognitive or behavioral therapy, acupuncture, and massage therapy. Follow up with your orthopedic surgeon next week. Take copies of your results.    Take Acetaminophen (Tylenol) and Ibuprofen as needed for pain, as directed on packaging. Read medication warnings.    A prescription for Valium was sent to your pharmacy. Take as directed on packaging. Read medication warnings.       Musculoskeletal Pain    Musculoskeletal pain refers to aches and pains in your bones, joints, muscles, and the tissues that surround them. This pain can occur in any part of the body. It can last for a short time (acute) or a long time (chronic).    A physical exam, lab tests, and imaging studies may be done to find the cause of your musculoskeletal pain.    Follow these instructions at home:     Lifestyle   •Try to control or lower your stress levels. Stress increases muscle tension and can worsen musculoskeletal pain. It is important to recognize when you are anxious or stressed and learn ways to manage it. This may include:  •Meditation or yoga.    •Cognitive or behavioral therapy.    •Acupuncture or massage therapy.    •You may continue all activities unless the activities cause more pain. When the pain gets better, slowly resume your normal activities. Gradually increase the intensity and duration of your activities or exercise.    Managing pain, stiffness, and swelling     •Take over-the-counter and prescription medicines only as told by your health care provider.    •When your pain is severe, bed rest may be helpful. Lie or sit in any position that is comfortable, but get out of bed and walk around at least every couple of hours.    •If directed, apply heat to the affected area as often as told by your health care provider. Use the heat source that your health care provider recommends, such as a moist heat pack or a heating pad.  •Place a towel between your skin and the heat source.    •Leave the heat on for 20–30 minutes.    •Remove the heat if your skin turns bright red. This is especially important if you are unable to feel pain, heat, or cold. You may have a greater risk of getting burned.      •If directed, put ice on the painful area.  •Put ice in a plastic bag.    •Place a towel between your skin and the bag.      •Leave the ice on for 20 minutes, 2–3 times a day.    General instructions     •Your health care provider may recommend that you see a physical therapist. This person can help you come up with a safe exercise program. Do any exercises as told by your physical therapist.    •Keep all follow-up visits, including any physical therapy visits, as told by your health care providers. This is important.    Contact a health care provider if:    •Your pain gets worse.    •Medicines do not help ease your pain.    •You cannot use the part of your body that hurts, such as your arm, leg, or neck.    •You have trouble sleeping.    •You have trouble doing your normal activities.    Get help right away if:  •You have a new injury and your pain is worse or different.  •You feel numb or you have tingling in the painful area.      Summary    •Musculoskeletal pain refers to aches and pains in your bones, joints, muscles, and the tissues that surround them.    •This pain can occur in any part of the body.    •Your health care provider may recommend that you see a physical therapist. This person can help you come up with a safe exercise program. Do any exercises as told by your physical therapist.    •Lower your stress level. Stress can worsen musculoskeletal pain. Ways to lower stress may include meditation, yoga, cognitive or behavioral therapy, acupuncture, and massage therapy.

## 2021-05-21 NOTE — ED PROVIDER NOTE - PATIENT PORTAL LINK FT
You can access the FollowMyHealth Patient Portal offered by [facility] by registering at the following website: [email or web address]. By joining [facility]’s FollowMyHealth portal, you will also be able to view your health information using other applications (apps) compatible with our system.

## 2021-05-21 NOTE — ED PROVIDER NOTE - NS ED ROS FT
General: Denies fevers  Eyes: Denies vision changes  CV: Denies chest pain  Resp: Denies SOB  GI: Denies abdominal pain, nausea, vomiting  : Denies painful urination  Skin: Denies new rashes  Neuro: Denies headache, focal weakness  MSK: +R shoulder pain

## 2021-05-21 NOTE — ED ADULT NURSE NOTE - NSIMPLEMENTINTERV_GEN_ALL_ED
Implemented All Fall Risk Interventions:  [place] to call system. Call bell, personal items and telephone within reach. Instruct patient to call for assistance. Room bathroom lighting operational. Non-slip footwear when patient is off stretcher. Physically safe environment: no spills, clutter or unnecessary equipment. Stretcher in lowest position, wheels locked, appropriate side rails in place. Provide visual cue, wrist band, yellow gown, etc. Monitor gait and stability. Monitor for mental status changes and reorient to person, place, and time. Review medications for side effects contributing to fall risk. Reinforce activity limits and safety measures with patient and family.

## 2021-07-28 ENCOUNTER — RESULT REVIEW (OUTPATIENT)
Age: 66
End: 2021-07-28

## 2021-09-10 ENCOUNTER — RESULT REVIEW (OUTPATIENT)
Age: 66
End: 2021-09-10

## 2022-01-18 NOTE — H&P PST ADULT - PRO ANTICIPATED DISCH DISP
Unfortunately she has lost another 5 lb been seen last which is almost 25 lb since prior to her colon resection  She is very fixated on her ostomy appliance and is continually changing the bag and having difficulty keeping it applied  I explained to her that she needs to continue to work with the visiting nurses and find a way to manage her appliance  She really needs to increase her protein 8 take significantly and stopped losing weight  At this point I believe she is too high risk to plan for ileostomy reversal due to the risk of anastomotic leak  She significantly malnourished and together with smoking I believe she is at too high risk for surgery  I told her she needs to increase her protein intake keep track of her calories and gains some weight back  I will check some nutrition labs to see what her pre-albumin albumin levels are  I will reach out to the wound center again to help with teaching ostomy management    I will see her back in 2-3 weeks home

## 2022-08-18 NOTE — H&P PST ADULT - MOUTH
Sent to [facility] -- Jennifer Troy at [facility]  Troy, , at [facility] ([facility].   Troy's direct line is [phone] normal mouth and gums

## 2022-10-16 ENCOUNTER — RESULT REVIEW (OUTPATIENT)
Age: 67
End: 2022-10-16

## 2023-02-02 NOTE — H&P PST ADULT - LYMPH NODES
Protocol For Photochemotherapy: Triamcinolone Ointment And Nbuvb: The patient received Photochemotherapy: Triamcinolone and NBUVB (triamcinolone ointment applied to all lesions prior to phototherapy). detailed exam

## 2024-12-27 NOTE — ED PROVIDER NOTE - PHYSICAL EXAMINATION
PHYSICAL EXAM:  GENERAL: non-toxic appearing; in no respiratory distress  HEAD: Atraumatic, Normocephalic;  NECK: No JVD; FROM  EYES: PERRL, EOMs intact b/l w/out deficits  CHEST/LUNG: CTAB no wheezes/rhonchi/rales  HEART: RRR no murmur/gallops/rubs  ABDOMEN: +BS, soft, NT, ND  EXTREMITIES: No LE edema, +2 radial pulses b/l; +2 DP/PT pulses bilaterally  MUSCULOSKELETAL: able to passively range knee to about 45 degrees; there is diffuse mild swelling of R knee vs Left; there is no overlying warmth or swelling; most tender to medial side of R knee and just superior to right medial knee  NERVOUS SYSTEM:  A&Ox3, No motor deficits or sensory deficits; CNII-XII intact; no focal neurologic deficits  SKIN:  No new rashes [facility]